# Patient Record
Sex: FEMALE | Race: BLACK OR AFRICAN AMERICAN | NOT HISPANIC OR LATINO | Employment: UNEMPLOYED | URBAN - METROPOLITAN AREA
[De-identification: names, ages, dates, MRNs, and addresses within clinical notes are randomized per-mention and may not be internally consistent; named-entity substitution may affect disease eponyms.]

---

## 2017-02-08 RX ORDER — QUETIAPINE FUMARATE 100 MG/1
100 TABLET, FILM COATED ORAL NIGHTLY
COMMUNITY
Start: 2017-02-04

## 2017-02-08 RX ORDER — PREDNISOLONE ACETATE 10 MG/ML
SUSPENSION/ DROPS OPHTHALMIC
COMMUNITY
Start: 2017-01-26 | End: 2018-08-09

## 2017-02-08 RX ORDER — IBUPROFEN 800 MG/1
TABLET ORAL
Refills: 0 | COMMUNITY
Start: 2017-01-07 | End: 2017-03-04

## 2017-02-08 RX ORDER — GLYBURIDE 2.5 MG/1
TABLET ORAL
COMMUNITY
Start: 2017-01-16 | End: 2022-09-07

## 2017-02-08 RX ORDER — FUROSEMIDE 40 MG/1
40 TABLET ORAL DAILY
COMMUNITY
Start: 2017-01-16

## 2017-02-08 RX ORDER — CALCIUM CITRATE/VITAMIN D3 200MG-6.25
TABLET ORAL
COMMUNITY
Start: 2017-01-30

## 2017-02-08 RX ORDER — HYDROCODONE BITARTRATE AND ACETAMINOPHEN 5; 325 MG/1; MG/1
TABLET ORAL
COMMUNITY
Start: 2017-02-06 | End: 2017-03-06

## 2017-02-08 RX ORDER — LOSARTAN POTASSIUM 50 MG/1
TABLET ORAL
Refills: 0 | COMMUNITY
Start: 2016-11-01 | End: 2017-03-06

## 2017-03-04 ENCOUNTER — HOSPITAL ENCOUNTER (EMERGENCY)
Facility: HOSPITAL | Age: 57
Discharge: HOME | End: 2017-03-04
Attending: EMERGENCY MEDICINE
Payer: MEDICAID

## 2017-03-04 VITALS
RESPIRATION RATE: 18 BRPM | SYSTOLIC BLOOD PRESSURE: 141 MMHG | TEMPERATURE: 98 F | DIASTOLIC BLOOD PRESSURE: 86 MMHG | HEART RATE: 94 BPM | HEIGHT: 67 IN | WEIGHT: 284 LBS | BODY MASS INDEX: 44.57 KG/M2 | OXYGEN SATURATION: 96 %

## 2017-03-04 DIAGNOSIS — R10.12 LEFT UPPER QUADRANT PAIN: ICD-10-CM

## 2017-03-04 DIAGNOSIS — R52 PAIN: ICD-10-CM

## 2017-03-04 DIAGNOSIS — R07.81 RIB PAIN ON LEFT SIDE: Primary | ICD-10-CM

## 2017-03-04 LAB
ALBUMIN SERPL BCP-MCNC: 4 G/DL
ALP SERPL-CCNC: 124 U/L
ALT SERPL W/O P-5'-P-CCNC: 15 U/L
AMYLASE SERPL-CCNC: 35 U/L
ANION GAP SERPL CALC-SCNC: 14 MMOL/L
AST SERPL-CCNC: 16 U/L
B-OH-BUTYR BLD STRIP-SCNC: 0.7 MMOL/L
BACTERIA #/AREA URNS HPF: ABNORMAL /HPF
BASOPHILS # BLD AUTO: 0.02 K/UL
BASOPHILS NFR BLD: 0.3 %
BILIRUB SERPL-MCNC: 0.6 MG/DL
BILIRUB UR QL STRIP: NEGATIVE
BUN SERPL-MCNC: 10 MG/DL
CALCIUM SERPL-MCNC: 9.7 MG/DL
CHLORIDE SERPL-SCNC: 99 MMOL/L
CLARITY UR: ABNORMAL
CO2 SERPL-SCNC: 23 MMOL/L
COLOR UR: YELLOW
CREAT SERPL-MCNC: 0.8 MG/DL
DIFFERENTIAL METHOD: NORMAL
EOSINOPHIL # BLD AUTO: 0.1 K/UL
EOSINOPHIL NFR BLD: 1 %
ERYTHROCYTE [DISTWIDTH] IN BLOOD BY AUTOMATED COUNT: 12.5 %
EST. GFR  (AFRICAN AMERICAN): >60 ML/MIN/1.73 M^2
EST. GFR  (NON AFRICAN AMERICAN): >60 ML/MIN/1.73 M^2
GLUCOSE SERPL-MCNC: 312 MG/DL (ref 70–110)
GLUCOSE SERPL-MCNC: 400 MG/DL
GLUCOSE UR QL STRIP: ABNORMAL
HCT VFR BLD AUTO: 42.7 %
HGB BLD-MCNC: 13.8 G/DL
HGB UR QL STRIP: ABNORMAL
KETONES UR QL STRIP: NEGATIVE
LEUKOCYTE ESTERASE UR QL STRIP: NEGATIVE
LIPASE SERPL-CCNC: 30 U/L
LYMPHOCYTES # BLD AUTO: 2.7 K/UL
LYMPHOCYTES NFR BLD: 33.8 %
MCH RBC QN AUTO: 28.8 PG
MCHC RBC AUTO-ENTMCNC: 32.3 %
MCV RBC AUTO: 89 FL
MICROSCOPIC COMMENT: ABNORMAL
MONOCYTES # BLD AUTO: 0.4 K/UL
MONOCYTES NFR BLD: 4.7 %
NEUTROPHILS # BLD AUTO: 4.7 K/UL
NEUTROPHILS NFR BLD: 60.1 %
NITRITE UR QL STRIP: NEGATIVE
PH UR STRIP: 5 [PH] (ref 5–8)
PLATELET # BLD AUTO: 217 K/UL
PMV BLD AUTO: 11.6 FL
POTASSIUM SERPL-SCNC: 4 MMOL/L
PROT SERPL-MCNC: 7.9 G/DL
PROT UR QL STRIP: NEGATIVE
RBC # BLD AUTO: 4.8 M/UL
RBC #/AREA URNS HPF: 1 /HPF (ref 0–4)
SODIUM SERPL-SCNC: 136 MMOL/L
SP GR UR STRIP: <=1.005 (ref 1–1.03)
SQUAMOUS #/AREA URNS HPF: 6 /HPF
TROPONIN I SERPL DL<=0.01 NG/ML-MCNC: <0.006 NG/ML
URN SPEC COLLECT METH UR: ABNORMAL
UROBILINOGEN UR STRIP-ACNC: NEGATIVE EU/DL
WBC # BLD AUTO: 7.84 K/UL
WBC #/AREA URNS HPF: 6 /HPF (ref 0–5)
YEAST URNS QL MICRO: ABNORMAL

## 2017-03-04 PROCEDURE — 83690 ASSAY OF LIPASE: CPT

## 2017-03-04 PROCEDURE — 84484 ASSAY OF TROPONIN QUANT: CPT

## 2017-03-04 PROCEDURE — 81000 URINALYSIS NONAUTO W/SCOPE: CPT

## 2017-03-04 PROCEDURE — 96374 THER/PROPH/DIAG INJ IV PUSH: CPT

## 2017-03-04 PROCEDURE — 80053 COMPREHEN METABOLIC PANEL: CPT

## 2017-03-04 PROCEDURE — 85025 COMPLETE CBC W/AUTO DIFF WBC: CPT

## 2017-03-04 PROCEDURE — 63600175 PHARM REV CODE 636 W HCPCS: Performed by: PHYSICIAN ASSISTANT

## 2017-03-04 PROCEDURE — 99284 EMERGENCY DEPT VISIT MOD MDM: CPT | Mod: 25

## 2017-03-04 PROCEDURE — 82962 GLUCOSE BLOOD TEST: CPT

## 2017-03-04 PROCEDURE — 82150 ASSAY OF AMYLASE: CPT

## 2017-03-04 PROCEDURE — 82010 KETONE BODYS QUAN: CPT

## 2017-03-04 PROCEDURE — 25000003 PHARM REV CODE 250: Performed by: PHYSICIAN ASSISTANT

## 2017-03-04 RX ORDER — IBUPROFEN 600 MG/1
600 TABLET ORAL EVERY 6 HOURS PRN
Qty: 20 TABLET | Refills: 0 | Status: SHIPPED | OUTPATIENT
Start: 2017-03-04 | End: 2017-03-06

## 2017-03-04 RX ORDER — KETOROLAC TROMETHAMINE 30 MG/ML
15 INJECTION, SOLUTION INTRAMUSCULAR; INTRAVENOUS
Status: COMPLETED | OUTPATIENT
Start: 2017-03-04 | End: 2017-03-04

## 2017-03-04 RX ORDER — HYDROCODONE BITARTRATE AND ACETAMINOPHEN 5; 325 MG/1; MG/1
1 TABLET ORAL
Status: COMPLETED | OUTPATIENT
Start: 2017-03-04 | End: 2017-03-04

## 2017-03-04 RX ADMIN — HYDROCODONE BITARTRATE AND ACETAMINOPHEN 1 TABLET: 5; 325 TABLET ORAL at 09:03

## 2017-03-04 RX ADMIN — KETOROLAC TROMETHAMINE 15 MG: 30 INJECTION, SOLUTION INTRAMUSCULAR at 09:03

## 2017-03-04 NOTE — ED PROVIDER NOTES
Encounter Date: 3/4/2017       History     Chief Complaint   Patient presents with    Abdominal Pain     left upper quadrant pain     Review of patient's allergies indicates:   Allergen Reactions    Penicillins Rash     HPI Comments:  Mame Kumari 56 y.o. nontoxic/afebrile female with PMH of HTN, DM, arthritis, GERD, asthma, neuropathy and obesity presented to the ED with C/O left-sided pain for the past one day.  She states that the pain is a constant ache located to the left lower rib and LUQ of the abdomen. Pain is exacerbated by certain movements and nothing mitigates pain. She reports some decreased appetite due to the pain however did eat this morning and C/O increased thirst and urination that is not uncommon for her. She denies any fever, chills, cough, sternal chest pain, SOB, nausea, vomiting, diarrhea, constipation, flank pain, or dysuria. She did not attempt any medications for the symptoms and did not take her regular medications today.     The history is provided by the patient.     Past Medical History:   Diagnosis Date    Arthritis     Asthma     Diabetes mellitus     GERD (gastroesophageal reflux disease)     Hypertension     Neuropathy      Past Surgical History:   Procedure Laterality Date    TUBAL LIGATION       Family History   Problem Relation Age of Onset    Cancer Mother     Cancer Father      Social History   Substance Use Topics    Smoking status: Never Smoker    Smokeless tobacco: None      Comment: Stay smoke free.    Alcohol use No     Review of Systems   Constitutional: Positive for appetite change. Negative for activity change, chills and fever.   HENT: Negative for congestion and sore throat.    Respiratory: Negative for cough, chest tightness, shortness of breath and wheezing.    Cardiovascular: Negative for chest pain.        Left lower rib pain    Gastrointestinal: Positive for abdominal pain. Negative for constipation, diarrhea, nausea and vomiting.    Endocrine: Positive for polydipsia and polyuria.   Genitourinary: Negative for dysuria, flank pain and hematuria.   Musculoskeletal: Negative for back pain.   Skin: Negative for rash.   Neurological: Negative for dizziness, weakness, light-headedness, numbness and headaches.   Hematological: Does not bruise/bleed easily.       Physical Exam   Initial Vitals   BP Pulse Resp Temp SpO2   03/04/17 0744 03/04/17 0744 03/04/17 0744 03/04/17 0744 03/04/17 0744   117/66 93 18 97.9 °F (36.6 °C) 97 %     Physical Exam    Nursing note and vitals reviewed.  Constitutional: Vital signs are normal. She appears well-developed and well-nourished. She is cooperative.  Non-toxic appearance. She does not appear ill. No distress.   HENT:   Head: Normocephalic and atraumatic.   Eyes: Conjunctivae and lids are normal.   Neck: Neck supple. No rigidity.   Cardiovascular: Normal rate and regular rhythm.   Pulmonary/Chest: Breath sounds normal. No respiratory distress. She has no wheezes. She has no rhonchi. She exhibits tenderness. She exhibits no crepitus, no edema and no swelling.       Abdominal: Soft. Normal appearance and bowel sounds are normal. She exhibits no distension. There is tenderness in the left upper quadrant. There is no rigidity, no rebound, no guarding and no CVA tenderness.       Musculoskeletal: Normal range of motion.   Neurological: She is alert and oriented to person, place, and time. She has normal strength. GCS eye subscore is 4. GCS verbal subscore is 5. GCS motor subscore is 6.   Skin: Skin is warm, dry and intact. No rash noted.   Psychiatric: She has a normal mood and affect. Her speech is normal and behavior is normal. Thought content normal.         ED Course   Procedures  Labs Reviewed   CBC W/ AUTO DIFFERENTIAL   COMPREHENSIVE METABOLIC PANEL   LIPASE   URINALYSIS   AMYLASE   TROPONIN I   BETA - HYDROXYBUTYRATE, SERUM     Imaging Results         X-Ray Chest PA And Lateral (Final result) Result time:   03/04/17 09:33:13    Final result by Bk Lozada MD (03/04/17 09:33:13)    Impression:      1.  No acute chest disease.      Electronically signed by: BK LOZADA MD  Date:     03/04/17  Time:    09:33     Narrative:    Comparison: March 17, 2016    Technique: 2 views of the chest    Findings: The lateral view is suboptimal secondary to patient positioning.  The cardiomediastinal silhouette is midline and within normal limits.  The lungs are clear.  Visualized osseous structures and soft tissues demonstrate no acute abnormalities.                Mame Kumari 56 y.o. nontoxic/afebrile female with PMH of HTN, DM, arthritis, GERD, asthma, neuropathy and obesity presented to the ED with C/O left-sided pain for the past one day.  She states that the pain is a constant ache located to the left lower rib and LUQ of the abdomen. Pain is exacerbated by certain movements and nothing mitigates pain. She reports some decreased appetite due to the pain however did eat this morning and C/O increased thirst and urination that is not uncommon for her. She denies any fever, chills, cough, sternal chest pain, SOB, nausea, vomiting, diarrhea, constipation, flank pain, or dysuria. She did not attempt any medications for the symptoms and did not take her regular medications today. ROS positive for left rib and abdominal pain.  Physical exam reveals patient well appearing in no obvious distress; lifting child during the exam. Lungs clear, heart regular rate and rhythm with TTP of the left lower rib border with no deformity, edema, contusion or rash. Abdomen is soft with mild LUQ tenderness; no rebound, rigidity or CVA tenderness and normal bowel sounds. Fair ROM of neck and all extremities with strength 5/5.     DDX: chest wall strain, pancreatitis, DKA, UTI, GERD, abdominal muscle strain    ED management: Pain reduced with toradol and norco in the ED. labs and CXR showing no acute abnormality of chest, negative troponin and  elevated glucose. Low suspicion for acute cardiac etiology as reproducible pain. Patient reports that she is going directly home to take DM medication. She was counseled about BG control.    Impression/Plan:The primary encounter diagnosis was Rib pain on left side. Diagnoses of Pain and Left upper quadrant pain were also pertinent to this visit.  Patient will follow up with Primary.  Patient cautioned on when to return to ED.  Pt. Understands and agrees with current treatment plan                      Attending Attestation:     Physician Attestation Statement for NP/PA:   I have conducted a face to face encounter with this patient in addition to the NP/PA, due to Medical Complexity    Other NP/PA Attestation Additions:    History of Present Illness: agree   Physical Exam: agree   Medical Decision Making: agree                 ED Course     Clinical Impression:   The primary encounter diagnosis was Rib pain on left side. Diagnoses of Pain and Left upper quadrant pain were also pertinent to this visit.          SHIVA Jackson  03/06/17 1545

## 2017-03-04 NOTE — ED AVS SNAPSHOT
[facility]  [address]  [place] [address]               Mame Kumari   3/4/2017  7:49 AM   ED    Description:  Female : 1960   Department:  [facility]           Your Care was Coordinated By:     Provider Role From To    Miguelangel Valadez MD Attending Provider 17 [id number] --    SHIVA Jackson Physician Assistant 17 [id number] --      Reason for Visit     Abdominal Pain           Diagnoses this Visit        Comments    Rib pain on left side    -  Primary     Pain         Left upper quadrant pain           ED Disposition     None           To Do List           Follow-up Information     Follow up with Teddy Blake MD. Go in 1 week.    Specialty:  Family Medicine    Contact information:    [address]  [address]  [place] [address]  [phone]         These Medications        Disp Refills Start End    ibuprofen (ADVIL,MOTRIN) 600 MG tablet 20 tablet 0 3/4/2017     Take 1 tablet (600 mg total) by mouth every 6 (six) hours as needed for Pain. - Oral    Pharmacy: [address] Ph #: [phone]         [facility]     [facility] Care [facility] -  Assistance  Registered nurses in the [facility] provide clinical advisement, health education, appointment booking, and other advisory services.  Call for this free service at [phone].             Medications           Message regarding Medications     Verify the changes and/or additions to your medication regime listed below are the same as discussed with your clinician today.  If any of these changes or additions are incorrect, please notify your healthcare provider.        START taking these NEW medications        Refills    ibuprofen (ADVIL,MOTRIN) 600 MG tablet 0    Sig: Take 1 tablet (600 mg total) by mouth every 6 (six) hours as needed for Pain.    Class: Print    Route: Oral      These medications were  "administered today        Dose Freq    ketorolac injection 15 mg 15 mg ED 1 Time    Sig: Inject 15 mg into the vein ED 1 Time.    Class: Normal    Route: Intravenous    Cosign for Ordering: Required by Miguelangel Valadez MD           Verify that the below list of medications is an accurate representation of the medications you are currently taking.  If none reported, the list may be blank. If incorrect, please contact your healthcare provider. Carry this list with you in case of emergency.           Current Medications     albuterol (ACCUNEB) 0.63 mg/3 mL Nebu Take 3 mLs (0.63 mg total) by nebulization every 6 (six) hours as needed.    duloxetine (CYMBALTA) 30 MG capsule Take 1 capsule (30 mg total) by mouth once daily.    fluticasone (FLONASE) 50 mcg/actuation nasal spray 1 spray by Each Nare route 2 (two) times daily as needed for Rhinitis.    furosemide (LASIX) 40 MG tablet     glyBURIDE (DIABETA) 2.5 MG tablet     glyBURIDE (DIABETA) 5 MG tablet Take 1 tablet (5 mg total) by mouth 2 (two) times daily with meals.    hydrocodone-acetaminophen 5-325mg (NORCO) 5-325 mg per tablet     ibuprofen (ADVIL,MOTRIN) 600 MG tablet Take 1 tablet (600 mg total) by mouth every 6 (six) hours as needed for Pain.    LANTUS SOLOSTAR 100 unit/mL (3 mL) InPn pen     lisinopril (PRINIVIL,ZESTRIL) 5 MG tablet Take 1 tablet (5 mg total) by mouth once daily.    losartan (COZAAR) 50 MG tablet TK 1 T PO QD    NOVOFINE 32 32 x 1/4 " Ndle     prednisoLONE acetate (PRED FORTE) 1 % DrpS     quetiapine (SEROQUEL) 100 MG Tab     tiotropium (SPIRIVA) 18 mcg inhalation capsule Inhale 1 capsule (18 mcg total) into the lungs once daily.    tramadol 150 mg MP25 Take 150 mg by mouth once daily.    TRUE METRIX GLUCOSE TEST STRIP Strp            Clinical Reference Information           Your Vitals Were     BP Pulse Temp Resp Height Weight    141/86 94 97.9 °F (36.6 °C) 18 5' 7" (1.702 m) 128.8 kg (284 lb)    SpO2 BMI             96% 44.48 kg/m2       "   Allergies as of 3/4/2017        Reactions    Penicillins Rash      Immunizations Administered on Date of Encounter - 3/4/2017     None      ED Micro, Lab, POCT     Start Ordered       Status Ordering Provider    03/04/17 0834 03/04/17 0833  Beta - Hydroxybutyrate, Serum  Once      Final result     03/04/17 0830 03/04/17 0830  Urinalysis Microscopic  Once      Final result     03/04/17 0829 03/04/17 0830  CBC W/ AUTO DIFFERENTIAL  Once      Final result     03/04/17 0829 03/04/17 0830  Comp. Metabolic Panel  STAT      Final result     03/04/17 0829 03/04/17 0830  Lipase  STAT      Final result     03/04/17 0829 03/04/17 0830  Urinalysis - Clean Catch  STAT      Final result     03/04/17 0829 03/04/17 0830  Amylase  STAT      Final result     03/04/17 0829 03/04/17 0830  Troponin I  STAT      Final result     03/04/17 0000 03/04/17 0747  POCT glucose     Comments:  This order was created through External Result Entry    Completed       ED Imaging Orders     Start Ordered       Status Ordering Provider    03/04/17 0847 03/04/17 0846  X-Ray Chest PA And Lateral  1 time imaging      Final result       Discharge References/Attachments     CHEST WALL PAIN, COSTOCHONDRITIS (ENGLISH)    ABDOMINAL PAIN, ADULT (ENGLISH)      Your Scheduled Appointments     Mar 06, 2017  1:20 PM CST   New Patient with AARON Thrasher - Family Medicine ([facility])    [address], [facility] [id number] [phone]              [facility] Sign-Up     Activating your [facility] account is as easy as 1-2-3!     1) Visit [email or web address], select Sign Up Now, enter this activation code and your date of birth, then select Next.  Activation code not generated  Current Patient Portal Status: Account disabled      2) Create a username and password to use when you visit [facility] in the future and select a security question in case you lose your password and select Next.    3) Enter your e-mail address and click  Sign Up!    Additional Information  If you have questions, please e-mail [email or web address] or call [phone] to talk to our [facility] staff. Remember, [facility] is NOT to be used for urgent needs. For medical emergencies, dial 911.          [facility] complies with applicable Federal civil rights laws and does not discriminate on the basis of race, color, national origin, age, disability, or sex.        Language Assistance Services     ATTENTION: Language assistance services are available, free of charge. Please call [phone].      ATENCIÓN: Si habla español, tiene a bal disposición servicios gratuitos de asistencia lingüística. Llame al [phone].     CHÚ Ý: N?u b?n nói Ti?ng Vi?t, có các d?ch v? h? tr? ngôn ng? mi?n phí dành cho b?n. G?i s? [phone].

## 2017-03-04 NOTE — ED NOTES
Pt presents to ED with left upper quadrant abdominal pain since last night. Pt denies nausea, vomiting or diarrhea. Pt does not present in distress. Rates 10/10.

## 2017-03-05 LAB — POCT GLUCOSE: 312 MG/DL (ref 70–110)

## 2017-05-27 ENCOUNTER — HOSPITAL ENCOUNTER (EMERGENCY)
Facility: HOSPITAL | Age: 57
Discharge: HOME | End: 2017-05-27
Attending: EMERGENCY MEDICINE
Payer: MEDICAID

## 2017-05-27 VITALS
WEIGHT: 275 LBS | RESPIRATION RATE: 18 BRPM | HEART RATE: 91 BPM | OXYGEN SATURATION: 98 % | BODY MASS INDEX: 44.2 KG/M2 | TEMPERATURE: 97 F | HEIGHT: 66 IN | SYSTOLIC BLOOD PRESSURE: 125 MMHG | DIASTOLIC BLOOD PRESSURE: 71 MMHG

## 2017-05-27 DIAGNOSIS — E11.65 TYPE 2 DIABETES MELLITUS WITH HYPERGLYCEMIA, UNSPECIFIED LONG TERM INSULIN USE STATUS: ICD-10-CM

## 2017-05-27 DIAGNOSIS — E08.43 DIABETIC AUTONOMIC NEUROPATHY ASSOCIATED WITH DIABETES MELLITUS DUE TO UNDERLYING CONDITION: ICD-10-CM

## 2017-05-27 DIAGNOSIS — G62.9 NEUROPATHY: ICD-10-CM

## 2017-05-27 DIAGNOSIS — K21.9 GASTROESOPHAGEAL REFLUX DISEASE, ESOPHAGITIS PRESENCE NOT SPECIFIED: Primary | ICD-10-CM

## 2017-05-27 LAB — POCT GLUCOSE: 324 MG/DL (ref 70–110)

## 2017-05-27 PROCEDURE — 99283 EMERGENCY DEPT VISIT LOW MDM: CPT | Mod: 25

## 2017-05-27 PROCEDURE — 25000003 PHARM REV CODE 250: Performed by: NURSE PRACTITIONER

## 2017-05-27 PROCEDURE — 63600175 PHARM REV CODE 636 W HCPCS: Performed by: NURSE PRACTITIONER

## 2017-05-27 PROCEDURE — 96372 THER/PROPH/DIAG INJ SC/IM: CPT

## 2017-05-27 PROCEDURE — 82962 GLUCOSE BLOOD TEST: CPT

## 2017-05-27 RX ORDER — KETOROLAC TROMETHAMINE 30 MG/ML
30 INJECTION, SOLUTION INTRAMUSCULAR; INTRAVENOUS
Status: COMPLETED | OUTPATIENT
Start: 2017-05-27 | End: 2017-05-27

## 2017-05-27 RX ORDER — PANTOPRAZOLE SODIUM 20 MG/1
20 TABLET, DELAYED RELEASE ORAL DAILY
Qty: 30 TABLET | Refills: 0 | Status: ON HOLD | OUTPATIENT
Start: 2017-05-27 | End: 2018-08-13 | Stop reason: HOSPADM

## 2017-05-27 RX ADMIN — KETOROLAC TROMETHAMINE 30 MG: 30 INJECTION, SOLUTION INTRAMUSCULAR at 12:05

## 2017-05-27 RX ADMIN — LIDOCAINE HYDROCHLORIDE: 20 SOLUTION ORAL; TOPICAL at 12:05

## 2017-05-27 NOTE — ED PROVIDER NOTES
"Encounter Date: 5/27/2017       History     Chief Complaint   Patient presents with    Leg Pain     bilateral leg pain for one week and left side pain     Review of patient's allergies indicates:   Allergen Reactions    Lantus [insulin glargine] Swelling     Face swelling and itching.    Penicillins Rash     55 yo F reports epigastric pain and chronic leg tingling. Pt states both complaints have been going on for several months but she "doesnt like her primary care doctor so she comes to the Er". Pt denies any new symptoms or change in symptoms. Denies N/V. Reports history of GERD and Diabetic Neuropathy. Was prescribed a "stomach medicine" but takes pepto-bismol instead. Also prescribed gabapentin for leg numbness and foot pain but doesn't take prescribed medications. Denies loss of bowel or bladder control. Denies injury to back. Denies radiation of pain.          Past Medical History:   Diagnosis Date    Arthritis     Asthma     Diabetes mellitus     GERD (gastroesophageal reflux disease)     Hypertension     Neuropathy      Past Surgical History:   Procedure Laterality Date    TUBAL LIGATION       Family History   Problem Relation Age of Onset    Cancer Mother     Cancer Father      Social History   Substance Use Topics    Smoking status: Never Smoker    Smokeless tobacco: Not on file      Comment: Stay smoke free.    Alcohol use No     Review of Systems   Constitutional: Negative for activity change, appetite change, chills and fever.   HENT: Negative for sore throat.    Respiratory: Negative for shortness of breath.    Cardiovascular: Negative for chest pain.   Gastrointestinal: Positive for abdominal pain. Negative for nausea.   Genitourinary: Negative for dysuria.   Musculoskeletal: Negative for back pain.   Skin: Negative for rash.   Neurological: Negative for dizziness, speech difficulty, weakness, light-headedness and numbness.        Leg tingling   Hematological: Does not bruise/bleed " "easily.   All other systems reviewed and are negative.      Physical Exam     Initial Vitals [05/27/17 1050]   BP Pulse Resp Temp SpO2   (!) 145/69 92 18 97.8 °F (36.6 °C) 97 %     Physical Exam    Nursing note and vitals reviewed.  Constitutional: She appears well-developed and well-nourished. No distress.   HENT:   Head: Normocephalic.   Eyes: Conjunctivae are normal.   Neck: Normal range of motion. Neck supple.   Cardiovascular: Normal rate.   Pulmonary/Chest: Breath sounds normal. No respiratory distress.   Abdominal: Soft. Bowel sounds are normal. She exhibits no distension and no abdominal bruit. There is generalized tenderness. There is no rebound and no guarding. No hernia.   Musculoskeletal:        Right foot: There is normal range of motion, no tenderness, no bony tenderness and no swelling.        Left foot: There is no tenderness and no swelling.   Neurological: She is alert and oriented to person, place, and time.   Skin: Skin is warm and dry. Capillary refill takes less than 2 seconds.   Psychiatric: She has a normal mood and affect. Her behavior is normal. Judgment and thought content normal.         ED Course   Procedures  Labs Reviewed - No data to display          Medical Decision Making:   Initial Assessment:   57 yo F reports epigastric pain and chronic leg tingling. Pt states both complaints have been going on for several months but she "doesnt like her primary care doctor so she comes to the Er". Pt denies any new symptoms or change in symptoms. Denies N/V. Reports history of GERD and Diabetic Neuropathy. Was prescribed a "stomach medicine" but takes pepto-bismol instead. Also prescribed gabapentin for leg numbness and foot pain but doesn't take prescribed medications. Denies loss of bowel or bladder control. Denies injury to back. Denies radiation of pain.  Differential Diagnosis:   Chronic GERD, Poorly controlled Dm, Chronic pain  ED Management:  Pt has not taken any home medications. " "Instructed to take home medications as prescribed for management of symptoms. CBG elevated pt states "I dont need medications for that, it does that when I eat". Educated pt on importance of taking home medications for glucose control and symptom management. Pt verbalized understanding will take medications at home upon discharge.                   ED Course     Clinical Impression:   The primary encounter diagnosis was Gastroesophageal reflux disease, esophagitis presence not specified. Diagnoses of Neuropathy, Diabetic autonomic neuropathy associated with diabetes mellitus due to underlying condition, and Type 2 diabetes mellitus with hyperglycemia, unspecified long term insulin use status were also pertinent to this visit.          Benita Olmstead NP  06/02/17 1201    "

## 2017-05-27 NOTE — ED NOTES
States she is feeling much better, denies pain at this time. Skin warm and dry. Resp. Even and unlabored. Pt instructed to take her home medication as directed when she gets home.

## 2017-11-04 ENCOUNTER — HOSPITAL ENCOUNTER (EMERGENCY)
Facility: HOSPITAL | Age: 57
Discharge: HOME | End: 2017-11-04
Attending: EMERGENCY MEDICINE
Payer: MEDICAID

## 2017-11-04 VITALS
DIASTOLIC BLOOD PRESSURE: 65 MMHG | RESPIRATION RATE: 18 BRPM | WEIGHT: 243 LBS | BODY MASS INDEX: 39.05 KG/M2 | OXYGEN SATURATION: 95 % | TEMPERATURE: 98 F | HEART RATE: 110 BPM | SYSTOLIC BLOOD PRESSURE: 143 MMHG | HEIGHT: 66 IN

## 2017-11-04 DIAGNOSIS — R10.9 ABDOMINAL PAIN: ICD-10-CM

## 2017-11-04 DIAGNOSIS — K52.9 GASTROENTERITIS: Primary | ICD-10-CM

## 2017-11-04 DIAGNOSIS — E11.65 POORLY CONTROLLED DIABETES MELLITUS: ICD-10-CM

## 2017-11-04 LAB
ANION GAP SERPL CALC-SCNC: 15 MMOL/L
BACTERIA #/AREA URNS AUTO: ABNORMAL /HPF
BILIRUB UR QL STRIP: NEGATIVE
BUN SERPL-MCNC: 9 MG/DL
CALCIUM SERPL-MCNC: 9.6 MG/DL
CHLORIDE SERPL-SCNC: 99 MMOL/L
CLARITY UR REFRACT.AUTO: ABNORMAL
CO2 SERPL-SCNC: 24 MMOL/L
COLOR UR AUTO: YELLOW
CREAT SERPL-MCNC: 0.59 MG/DL
EST. GFR  (AFRICAN AMERICAN): >60 ML/MIN/1.73 M^2
EST. GFR  (NON AFRICAN AMERICAN): >60 ML/MIN/1.73 M^2
GLUCOSE SERPL-MCNC: 491 MG/DL
GLUCOSE UR QL STRIP: ABNORMAL
HGB UR QL STRIP: ABNORMAL
HYALINE CASTS UR QL AUTO: 0 /LPF
KETONES UR QL STRIP: NEGATIVE
LEUKOCYTE ESTERASE UR QL STRIP: ABNORMAL
MICROSCOPIC COMMENT: ABNORMAL
NITRITE UR QL STRIP: NEGATIVE
PH UR STRIP: 5 [PH] (ref 5–8)
POCT GLUCOSE: 340 MG/DL (ref 70–110)
POCT GLUCOSE: 343 MG/DL (ref 70–110)
POCT GLUCOSE: 347 MG/DL (ref 70–110)
POCT GLUCOSE: 401 MG/DL (ref 70–110)
POTASSIUM SERPL-SCNC: 3.8 MMOL/L
PROT UR QL STRIP: ABNORMAL
RBC #/AREA URNS AUTO: 5 /HPF (ref 0–4)
SODIUM SERPL-SCNC: 138 MMOL/L
SP GR UR STRIP: 1.01 (ref 1–1.03)
SQUAMOUS #/AREA URNS AUTO: 10 /HPF
URN SPEC COLLECT METH UR: ABNORMAL
UROBILINOGEN UR STRIP-ACNC: NEGATIVE EU/DL
WBC #/AREA URNS AUTO: 20 /HPF (ref 0–5)
YEAST UR QL AUTO: ABNORMAL

## 2017-11-04 PROCEDURE — 96376 TX/PRO/DX INJ SAME DRUG ADON: CPT

## 2017-11-04 PROCEDURE — 81000 URINALYSIS NONAUTO W/SCOPE: CPT

## 2017-11-04 PROCEDURE — 96374 THER/PROPH/DIAG INJ IV PUSH: CPT

## 2017-11-04 PROCEDURE — 80048 BASIC METABOLIC PNL TOTAL CA: CPT

## 2017-11-04 PROCEDURE — 63600175 PHARM REV CODE 636 W HCPCS: Performed by: EMERGENCY MEDICINE

## 2017-11-04 PROCEDURE — 82962 GLUCOSE BLOOD TEST: CPT

## 2017-11-04 PROCEDURE — 93005 ELECTROCARDIOGRAM TRACING: CPT

## 2017-11-04 PROCEDURE — 99284 EMERGENCY DEPT VISIT MOD MDM: CPT | Mod: 25

## 2017-11-04 PROCEDURE — 87086 URINE CULTURE/COLONY COUNT: CPT

## 2017-11-04 PROCEDURE — 25000003 PHARM REV CODE 250: Performed by: EMERGENCY MEDICINE

## 2017-11-04 PROCEDURE — 93010 ELECTROCARDIOGRAM REPORT: CPT | Mod: ,,, | Performed by: INTERNAL MEDICINE

## 2017-11-04 RX ORDER — ONDANSETRON 4 MG/1
4 TABLET, ORALLY DISINTEGRATING ORAL EVERY 8 HOURS PRN
Qty: 10 TABLET | Refills: 0 | Status: SHIPPED | OUTPATIENT
Start: 2017-11-04 | End: 2021-05-03

## 2017-11-04 RX ORDER — ONDANSETRON 4 MG/1
4 TABLET, ORALLY DISINTEGRATING ORAL
Status: COMPLETED | OUTPATIENT
Start: 2017-11-04 | End: 2017-11-04

## 2017-11-04 RX ORDER — NITROFURANTOIN 25; 75 MG/1; MG/1
100 CAPSULE ORAL 2 TIMES DAILY
Qty: 14 CAPSULE | Refills: 0 | Status: SHIPPED | OUTPATIENT
Start: 2017-11-04 | End: 2017-11-11

## 2017-11-04 RX ADMIN — ONDANSETRON 4 MG: 4 TABLET, ORALLY DISINTEGRATING ORAL at 04:11

## 2017-11-04 RX ADMIN — INSULIN HUMAN 10 UNITS: 100 INJECTION, SOLUTION PARENTERAL at 05:11

## 2017-11-04 RX ADMIN — LIDOCAINE HYDROCHLORIDE: 20 SOLUTION ORAL; TOPICAL at 06:11

## 2017-11-04 NOTE — ED NOTES
Pt unable to provide stool sample at this time. Pt asking for water. Educated pt about NPO status. Explained that she would be allowed to have some ice chips for a PO challenge in a little while.

## 2017-11-04 NOTE — ED PROVIDER NOTES
"Encounter Date: 11/4/2017       History     Chief Complaint   Patient presents with    Fatigue     Pt reprots generalized weakness and "acid in my stomach". Pt denies N/V/D. PT denies SOb or CP.    Abdominal Pain     This patient is a 56-year-old female.  Presents to the emergency department complaining of nausea vomiting and diarrhea.  She came to the emergency department because she was having some abdominal cramps, and feeling generally weak.  After she arrived here, the vomiting and diarrhea started, she has had 3 episodes of each, watery diarrhea, nonbloody.    He is diabetic, said that she has not checked her blood sugar .  No sick contacts, recent travel, or consumption of raw or suspicious food.  No history of liver or renal disease.          Review of patient's allergies indicates:   Allergen Reactions    Lantus [insulin glargine] Swelling     Face swelling and itching.    Penicillins Rash     Past Medical History:   Diagnosis Date    Arthritis     Asthma     Diabetes mellitus     GERD (gastroesophageal reflux disease)     Hypertension     Neuropathy      Past Surgical History:   Procedure Laterality Date    TUBAL LIGATION       Family History   Problem Relation Age of Onset    Cancer Mother     Cancer Father      Social History   Substance Use Topics    Smoking status: Never Smoker    Smokeless tobacco: Never Used      Comment: Stay smoke free.    Alcohol use No     Review of Systems   Constitutional: Positive for chills and fatigue.   Gastrointestinal: Positive for abdominal pain, diarrhea, nausea and vomiting.   Endocrine:        Diabetic   All other systems reviewed and are negative.      Physical Exam     Initial Vitals [11/04/17 1500]   BP Pulse Resp Temp SpO2   (!) 181/105 108 20 97.9 °F (36.6 °C) 96 %      MAP       130.33         Physical Exam    Nursing note and vitals reviewed.  Constitutional: She appears well-developed. She is not diaphoretic. No distress.   Obese female in no " apparent distress   HENT:   Head: Normocephalic.   Right Ear: External ear normal.   Left Ear: External ear normal.   Nose: Nose normal.   Mouth/Throat: Oropharynx is clear and moist.   Eyes: Conjunctivae are normal. Pupils are equal, round, and reactive to light. No scleral icterus.   Neck: No JVD present.   Cardiovascular: Normal rate, regular rhythm, normal heart sounds and intact distal pulses.   No murmur heard.  Pulmonary/Chest: Breath sounds normal. No stridor. No respiratory distress. She has no wheezes.   Abdominal: Soft. She exhibits no distension. There is no tenderness.   Musculoskeletal: She exhibits no edema or tenderness.   Neurological: She is alert. She has normal strength. No sensory deficit.   Skin: Skin is warm and dry. No rash noted.   Psychiatric: She has a normal mood and affect.         ED Course   Procedures  Labs Reviewed   URINALYSIS - Abnormal; Notable for the following:        Result Value    Appearance, UA Cloudy (*)     Protein, UA 1+ (*)     Glucose, UA 4+ (*)     Occult Blood UA 3+ (*)     Leukocytes, UA 3+ (*)     All other components within normal limits   URINALYSIS MICROSCOPIC - Abnormal; Notable for the following:     RBC, UA 5 (*)     WBC, UA 20 (*)     Bacteria, UA Moderate (*)     All other components within normal limits   BASIC METABOLIC PANEL - Abnormal; Notable for the following:     Glucose 491 (*)     All other components within normal limits    Narrative:       GLU critical result(s) called and verbal readback obtained from   , 11/04/2017 18:24   POCT GLUCOSE - Abnormal; Notable for the following:     POCT Glucose 401 (*)     All other components within normal limits   POCT GLUCOSE - Abnormal; Notable for the following:     POCT Glucose 347 (*)     All other components within normal limits   CULTURE, URINE   CULTURE, STOOL   CULTURE, URINE   WBC, STOOL   POCT GLUCOSE MONITORING CONTINUOUS   POCT GLUCOSE MONITORING CONTINUOUS        Results for orders placed  or performed during the hospital encounter of 11/04/17   Urinalysis   Result Value Ref Range    Specimen UA Urine, Clean Catch     Color, UA Yellow Yellow, Straw, Tesha    Appearance, UA Cloudy (A) Clear    pH, UA 5.0 5.0 - 8.0    Specific Gravity, UA 1.010 1.005 - 1.030    Protein, UA 1+ (A) Negative    Glucose, UA 4+ (A) Negative    Ketones, UA Negative Negative    Bilirubin (UA) Negative Negative    Occult Blood UA 3+ (A) Negative    Nitrite, UA Negative Negative    Urobilinogen, UA Negative <2.0 EU/dL    Leukocytes, UA 3+ (A) Negative   Urinalysis Microscopic   Result Value Ref Range    RBC, UA 5 (H) 0 - 4 /hpf    WBC, UA 20 (H) 0 - 5 /hpf    Bacteria, UA Moderate (A) None-Occ /hpf    Yeast, UA None None    Squam Epithel, UA 10 /hpf    Hyaline Casts, UA 0 0-1/lpf /lpf    Microscopic Comment SEE COMMENT    Basic metabolic panel   Result Value Ref Range    Sodium 138 136 - 145 mmol/L    Potassium 3.8 3.5 - 5.1 mmol/L    Chloride 99 95 - 110 mmol/L    CO2 24 23 - 29 mmol/L    Glucose 491 (HH) 70 - 110 mg/dL    BUN, Bld 9 7 - 17 mg/dL    Creatinine 0.59 0.50 - 1.40 mg/dL    Calcium 9.6 8.7 - 10.5 mg/dL    Anion Gap 15 8 - 16 mmol/L    eGFR if African American >60.0 >60 mL/min/1.73 m^2    eGFR if non African American >60.0 >60 mL/min/1.73 m^2   POCT glucose   Result Value Ref Range    POCT Glucose 401 (H) 70 - 110 mg/dL   POCT glucose   Result Value Ref Range    POCT Glucose 347 (H) 70 - 110 mg/dL        Medical Decision Making:   Initial Assessment:   This patient presents with vomiting and diarrhea, sounds like typical viral gastroenteritis.  She has not been able to produce a stool specimen in the emergency department to sent to lab for testing.  She is also diabetic, and her blood sugar is poorly controlled, over 400 initially.  She has been given 2 doses of IV insulin, and her blood sugar is now under 300.  Advised her to check her blood sugar at least twice a day, I will place her on Zofran to use as needed for  nausea and vomiting.  She also has evidence of urinary tract infection, so I will place her on Macrobid, urine culture sent, does not appear to have pyelonephritis and she does not have a fever or flank tenderness.  She should follow-up with her primary care doctor in 2 days to have her blood sugar rechecked and for the results of the urine culture.                   ED Course      Clinical Impression:   The primary encounter diagnosis was Gastroenteritis. Diagnoses of Abdominal pain and Poorly controlled diabetes mellitus were also pertinent to this visit.    Disposition:   Disposition: Discharged  Condition: Stable                        Rui Esteban MD  11/04/17 3125

## 2017-11-04 NOTE — DISCHARGE INSTRUCTIONS
Check your blood sugar at least twice a day.  Follow-up with her primary care physician in 2-3 days to check your urine culture results, and to recheck your blood sugar

## 2017-11-05 NOTE — ED NOTES
"Pt and spouse upset that pt has not been provided with food even though she has been receiving insulin. Explained that pt's CBG has not decreased enough and that providing food would continue to keep the CBG elevated. Family reports that the sugar remains high because she HAS NOT eaten. Explained to pt that insulin brings the sugar down and eating will make it rise again. Pt states he "must have been told wrong then". Pt refusing insulin stating that she will take her home insulin when she gets home. Pt leaves with discharge papers in hand with prescriptions. Explained the importance of monitoring CBG and taking her meds regularly. Pt advised that she needs to follow up with her PCP. Pt verbalizes understanding.   "

## 2017-11-06 LAB
BACTERIA UR CULT: NORMAL
BACTERIA UR CULT: NORMAL

## 2018-05-21 ENCOUNTER — HOSPITAL ENCOUNTER (EMERGENCY)
Facility: HOSPITAL | Age: 58
Discharge: HOME | End: 2018-05-21
Attending: EMERGENCY MEDICINE
Payer: MEDICAID

## 2018-05-21 VITALS
TEMPERATURE: 98 F | RESPIRATION RATE: 20 BRPM | OXYGEN SATURATION: 99 % | DIASTOLIC BLOOD PRESSURE: 82 MMHG | SYSTOLIC BLOOD PRESSURE: 190 MMHG | WEIGHT: 250 LBS | HEART RATE: 95 BPM | HEIGHT: 66 IN | BODY MASS INDEX: 40.18 KG/M2

## 2018-05-21 DIAGNOSIS — M19.90 ARTHRITIS: Primary | ICD-10-CM

## 2018-05-21 PROCEDURE — 99283 EMERGENCY DEPT VISIT LOW MDM: CPT | Mod: 25

## 2018-05-21 PROCEDURE — 63600175 PHARM REV CODE 636 W HCPCS: Performed by: EMERGENCY MEDICINE

## 2018-05-21 PROCEDURE — 96372 THER/PROPH/DIAG INJ SC/IM: CPT

## 2018-05-21 RX ORDER — DEXAMETHASONE SODIUM PHOSPHATE 4 MG/ML
12 INJECTION, SOLUTION INTRA-ARTICULAR; INTRALESIONAL; INTRAMUSCULAR; INTRAVENOUS; SOFT TISSUE
Status: COMPLETED | OUTPATIENT
Start: 2018-05-21 | End: 2018-05-21

## 2018-05-21 RX ORDER — QUETIAPINE FUMARATE 50 MG/1
50 TABLET, FILM COATED ORAL NIGHTLY
COMMUNITY
End: 2019-09-18

## 2018-05-21 RX ORDER — KETOROLAC TROMETHAMINE 10 MG/1
10 TABLET, FILM COATED ORAL EVERY 6 HOURS
Qty: 15 TABLET | Refills: 0 | Status: SHIPPED | OUTPATIENT
Start: 2018-05-21 | End: 2018-11-17

## 2018-05-21 RX ADMIN — DEXAMETHASONE SODIUM PHOSPHATE 12 MG: 4 INJECTION, SOLUTION INTRAMUSCULAR; INTRAVENOUS at 10:05

## 2018-05-22 NOTE — ED PROVIDER NOTES
Encounter Date: 5/21/2018       History     Chief Complaint   Patient presents with    Knee Pain     R knee pain x 2 months, worsening today; denies injury; states was seen and tx for same on 5/19/18 at [facility] and hx of arthritis, reports no improvement     The history is provided by the patient.   Leg Pain    The incident occurred at home. There was no injury mechanism. The incident occurred several weeks ago. The pain is present in the right knee. The quality of the pain is described as throbbing. The pain is at a severity of 6/10. The pain has been constant since onset. Pertinent negatives include no numbness, no inability to bear weight, no loss of motion, no muscle weakness, no loss of sensation and no tingling. She reports no foreign bodies present. The symptoms are aggravated by bearing weight and palpation. She has tried nothing for the symptoms.     Review of patient's allergies indicates:   Allergen Reactions    Lantus [insulin glargine] Swelling     Face swelling and itching.    Penicillins Rash     Past Medical History:   Diagnosis Date    Arthritis     Asthma     Diabetes mellitus     GERD (gastroesophageal reflux disease)     Hypertension     Neuropathy      Past Surgical History:   Procedure Laterality Date    TUBAL LIGATION       Family History   Problem Relation Age of Onset    Cancer Mother     Cancer Father      Social History   Substance Use Topics    Smoking status: Never Smoker    Smokeless tobacco: Never Used      Comment: Stay smoke free.    Alcohol use No     Review of Systems   Musculoskeletal: Positive for arthralgias.   Neurological: Negative for tingling and numbness.   All other systems reviewed and are negative.      Physical Exam     Initial Vitals [05/21/18 2144]   BP Pulse Resp Temp SpO2   (!) 190/82 95 20 98.4 °F (36.9 °C) 99 %      MAP       118         Physical Exam    Nursing note and vitals reviewed.  Constitutional: She appears well-developed and  well-nourished.   HENT:   Head: Normocephalic and atraumatic.   Eyes: Conjunctivae and EOM are normal.   Neck: Normal range of motion. Neck supple.   Cardiovascular: Normal rate, regular rhythm, normal heart sounds and intact distal pulses.   Pulmonary/Chest: Breath sounds normal.   Abdominal: Soft.   Musculoskeletal:        Right knee: She exhibits decreased range of motion and swelling. She exhibits no effusion, no ecchymosis, no deformity, no laceration, no erythema, normal alignment and normal patellar mobility. No tenderness found.   Neurological: She is alert and oriented to person, place, and time.   Skin: Skin is warm and dry. Capillary refill takes less than 2 seconds.   Psychiatric: She has a normal mood and affect. Her behavior is normal. Judgment and thought content normal.         ED Course   Procedures  Labs Reviewed - No data to display                               Clinical Impression:   The encounter diagnosis was Arthritis.    Disposition:   Disposition: Discharged  Condition: Stable                        Mary Alfaro MD  05/21/18 1467

## 2018-05-29 ENCOUNTER — OFFICE VISIT (OUTPATIENT)
Dept: ORTHOPEDICS | Facility: CLINIC | Age: 58
End: 2018-05-29
Payer: MEDICAID

## 2018-05-29 VITALS — WEIGHT: 250 LBS | BODY MASS INDEX: 40.18 KG/M2 | HEIGHT: 66 IN

## 2018-05-29 DIAGNOSIS — M17.11 PRIMARY OSTEOARTHRITIS OF RIGHT KNEE: Primary | ICD-10-CM

## 2018-05-29 PROCEDURE — 99999 PR PBB SHADOW E&M-EST. PATIENT-LVL II: CPT | Mod: PBBFAC,,, | Performed by: ORTHOPAEDIC SURGERY

## 2018-05-29 PROCEDURE — 99212 OFFICE O/P EST SF 10 MIN: CPT | Mod: PBBFAC,PN | Performed by: ORTHOPAEDIC SURGERY

## 2018-05-29 PROCEDURE — 99202 OFFICE O/P NEW SF 15 MIN: CPT | Mod: S$PBB,,, | Performed by: ORTHOPAEDIC SURGERY

## 2018-05-29 NOTE — LETTER
May 29, 2018      ADAMA Epstein MD  [address]  [address]  [facility] [address]           [facility]  [address]  [facility] [address]  Phone: [phone]  Fax: [phone]          Patient: Mame Kumari   MR Number: [id number]   YOB: 1960   Date of Visit: 5/29/2018       Dear Dr. ADAMA Epstein:    Thank you for referring Mame Kumari to me for evaluation. Attached you will find relevant portions of my assessment and plan of care.    If you have questions, please do not hesitate to call me. I look forward to following Mame Kumari along with you.    Sincerely,    Jairo Cruz MD    Enclosure  CC:  No Recipients    If you would like to receive this communication electronically, please contact [email or web address] or [phone] to request more information on [email or web address] Link access.    For providers and/or their staff who would like to refer a patient to [facility], please contact us through our one-stop-shop provider referral line, [facility], at [phone].    If you feel you have received this communication in error or would no longer like to receive these types of communications, please e-mail [email or web address]

## 2018-05-29 NOTE — PROGRESS NOTES
Subjective:      Patient ID: Mame Kumari is a 57 y.o. female.    Chief Complaint: Pain of the Right Knee    HPI     They have experienced problems with their right knee over the past 3 months. The patient denies relevant history of injury/aggravation. Pain is located medially Associated symptoms include swelling and giving way.  Symptoms are aggravated by walking. They have been treated with over the counter analgesics, steroid injection(s) and hyaluronidase injection(s).   Symptoms have recently stayed the same. Ambulation reportedly has been impaired. Self care ADLs are painful.     Review of Systems   Constitution: Negative for fever and weight loss.   HENT: Negative for congestion.    Eyes: Negative for visual disturbance.   Cardiovascular: Negative for chest pain.   Respiratory: Negative for shortness of breath.    Hematologic/Lymphatic: Negative for bleeding problem. Does not bruise/bleed easily.   Skin: Negative for poor wound healing.   Musculoskeletal: Positive for joint pain and joint swelling.   Gastrointestinal: Negative for abdominal pain.   Genitourinary: Negative for dysuria.   Neurological: Negative for seizures.   Psychiatric/Behavioral: Negative for altered mental status.   Allergic/Immunologic: Negative for persistent infections.         Objective:            Ortho/SPM Exam    Right Knee    There were no vitals filed for this visit.    The patient is not in acute distress.   Body habitus is obese.   The patient walks with a limp.  Resisted SLR negative.   The skin over the knee is intact.  Knee effusion trace  Tendernes is located presentmedial  Range of motion- Flexion 140 deg, Extension 0 deg,   Ligament exam:   MCL trace   Lachman intact              Post sag intact    LCL intact  Patellar apprehension negative.  Popliteal cyst negative  Patellar crepitation absent.  Flexion/pinch negative.  Pulses DP present, PT present.  Motor normal 5/5 strength in all tested muscle groups.   Sensory  normal.          There were no vitals filed for this visit.    I reviewed the relevant radiographic images for the patient's condition:  The left knee has mild medial compartment narrowing        Assessment:       No diagnosis found.     .  The acute symptoms are likely due to bone marrow edema  Plan:       There are no diagnoses linked to this encounter.    I explained my diagnostic impression and the reasoning behind it in detail, using layman's terms.  Models and/or pictures were used to help in the explanation.    Analgesics and oral medication should be at the discretion of primary physician    Brace applied and usage explained    Weight loss recommended

## 2018-05-31 ENCOUNTER — TELEPHONE (OUTPATIENT)
Dept: ORTHOPEDICS | Facility: CLINIC | Age: 58
End: 2018-05-31

## 2018-05-31 NOTE — TELEPHONE ENCOUNTER
Left message for patient to return call to office.    ----- Message from Dawna Boucher sent at 5/31/2018  8:28 AM CDT -----  Contact: Self. [phone]  Patient would like to speak with you about having severe knee pain since the brace was given to her. Please advise

## 2018-06-01 ENCOUNTER — HOSPITAL ENCOUNTER (EMERGENCY)
Facility: HOSPITAL | Age: 58
Discharge: HOME | End: 2018-06-01
Attending: EMERGENCY MEDICINE
Payer: MEDICAID

## 2018-06-01 VITALS
OXYGEN SATURATION: 100 % | TEMPERATURE: 98 F | SYSTOLIC BLOOD PRESSURE: 136 MMHG | WEIGHT: 272 LBS | HEIGHT: 66 IN | RESPIRATION RATE: 20 BRPM | BODY MASS INDEX: 43.71 KG/M2 | HEART RATE: 99 BPM | DIASTOLIC BLOOD PRESSURE: 73 MMHG

## 2018-06-01 DIAGNOSIS — M23.91 INTERNAL DERANGEMENT OF MULTIPLE SITES OF RIGHT KNEE: Primary | ICD-10-CM

## 2018-06-01 PROCEDURE — 99283 EMERGENCY DEPT VISIT LOW MDM: CPT

## 2018-06-01 PROCEDURE — 25000003 PHARM REV CODE 250: Performed by: EMERGENCY MEDICINE

## 2018-06-01 RX ORDER — NAPROXEN 500 MG/1
500 TABLET ORAL 2 TIMES DAILY WITH MEALS
Qty: 30 TABLET | Refills: 0 | Status: ON HOLD | OUTPATIENT
Start: 2018-06-01 | End: 2018-08-13

## 2018-06-01 RX ORDER — HYDROCODONE BITARTRATE AND ACETAMINOPHEN 5; 325 MG/1; MG/1
1 TABLET ORAL
Status: COMPLETED | OUTPATIENT
Start: 2018-06-01 | End: 2018-06-01

## 2018-06-01 RX ORDER — METHOCARBAMOL 500 MG/1
500 TABLET, FILM COATED ORAL
Status: COMPLETED | OUTPATIENT
Start: 2018-06-01 | End: 2018-06-01

## 2018-06-01 RX ORDER — HYDROCODONE BITARTRATE AND ACETAMINOPHEN 5; 325 MG/1; MG/1
1 TABLET ORAL EVERY 6 HOURS PRN
Qty: 4 TABLET | Refills: 0 | Status: SHIPPED | OUTPATIENT
Start: 2018-06-01 | End: 2018-08-09

## 2018-06-01 RX ORDER — METHOCARBAMOL 500 MG/1
500 TABLET, FILM COATED ORAL 3 TIMES DAILY
Qty: 30 TABLET | Refills: 0 | Status: SHIPPED | OUTPATIENT
Start: 2018-06-01 | End: 2018-06-06

## 2018-06-01 RX ADMIN — METHOCARBAMOL 500 MG: 500 TABLET ORAL at 09:06

## 2018-06-01 RX ADMIN — HYDROCODONE BITARTRATE AND ACETAMINOPHEN 1 TABLET: 5; 325 TABLET ORAL at 09:06

## 2018-06-01 NOTE — DISCHARGE INSTRUCTIONS
Using Ace wrap and walking around.  Apply ice to the 2-3 times daily.  Follow-up with her orthopedic appointment this coming week.

## 2018-06-01 NOTE — ED PROVIDER NOTES
"Encounter Date: 6/1/2018       History     Chief Complaint   Patient presents with    Knee Pain     Pt states has had pain to right knee x 1 month ago, states "I went to the foot doctor and he gave me meloxicam and it triggered my knee pain."  Pt denies going to PCP for follow up.      Well-developed 57-year-old female comes emergent complaints of right knee pain.  Patient states that she took the meloxicam prescribed to her by her primary care physician.  Shortly thereafter the knee became extremely painful.  Came to the emergency room for further violation.  No swelling.  Ambulates normally on the leg.  No limp.  No known trauma.          Review of patient's allergies indicates:   Allergen Reactions    Lantus [insulin glargine] Swelling     Face swelling and itching.    Penicillins Rash     Past Medical History:   Diagnosis Date    Arthritis     Asthma     Diabetes mellitus     GERD (gastroesophageal reflux disease)     Hypertension     Neuropathy      Past Surgical History:   Procedure Laterality Date    TUBAL LIGATION       Family History   Problem Relation Age of Onset    Cancer Mother     Cancer Father      Social History   Substance Use Topics    Smoking status: Never Smoker    Smokeless tobacco: Never Used      Comment: Stay smoke free.    Alcohol use No     Review of Systems   Constitutional: Negative.    HENT: Negative.    Respiratory: Negative.    Cardiovascular: Negative.    Gastrointestinal: Negative.    Genitourinary: Negative.    Musculoskeletal: Negative.         Positive right knee pain.   Skin: Negative.    Neurological: Negative.    All other systems reviewed and are negative.      Physical Exam     Initial Vitals [06/01/18 0926]   BP Pulse Resp Temp SpO2   136/73 99 20 98.3 °F (36.8 °C) 100 %      MAP       94         Physical Exam    Nursing note reviewed.  Constitutional: She appears well-developed and well-nourished.   HENT:   Head: Normocephalic and atraumatic.   Eyes: Pupils " are equal, round, and reactive to light.   Cardiovascular: Normal rate, regular rhythm and normal heart sounds.   Pulmonary/Chest: Breath sounds normal.   Abdominal: Soft.   Musculoskeletal: Normal range of motion.   Right knee examination reveals negative Lockman.  No tenderness palpation at the joint line medial lateral or anteriorly.  Normal anatomy.  Normal size compared with the opposite knee.  Full range of motion.  No crepitus.  Normal exam.   Neurological: She is alert and oriented to person, place, and time. She has normal strength.   Skin: Skin is warm. Capillary refill takes less than 2 seconds.   Psychiatric: She has a normal mood and affect. Thought content normal.         ED Course   Procedures  Labs Reviewed - No data to display          Medical Decision Making:   Differential Diagnosis:   Strain, fracture, osteoporosis, gout, internal arrangement.                      Clinical Impression:   The encounter diagnosis was Internal derangement of multiple sites of right knee.    No orders to display       Disposition:   Disposition: Discharged  Condition: Stable                        Darnell Merida MD  06/01/18 0941

## 2018-06-06 ENCOUNTER — HOSPITAL ENCOUNTER (EMERGENCY)
Facility: HOSPITAL | Age: 58
Discharge: HOME | End: 2018-06-06
Attending: EMERGENCY MEDICINE
Payer: MEDICAID

## 2018-06-06 VITALS
TEMPERATURE: 98 F | WEIGHT: 271 LBS | RESPIRATION RATE: 20 BRPM | BODY MASS INDEX: 43.55 KG/M2 | OXYGEN SATURATION: 97 % | HEIGHT: 66 IN | SYSTOLIC BLOOD PRESSURE: 132 MMHG | HEART RATE: 94 BPM | DIASTOLIC BLOOD PRESSURE: 85 MMHG

## 2018-06-06 DIAGNOSIS — G89.29 CHRONIC PAIN OF RIGHT KNEE: Primary | ICD-10-CM

## 2018-06-06 DIAGNOSIS — M25.561 CHRONIC PAIN OF RIGHT KNEE: Primary | ICD-10-CM

## 2018-06-06 LAB — POCT GLUCOSE: 283 MG/DL (ref 70–110)

## 2018-06-06 PROCEDURE — 99283 EMERGENCY DEPT VISIT LOW MDM: CPT | Mod: 25

## 2018-06-06 PROCEDURE — 96372 THER/PROPH/DIAG INJ SC/IM: CPT

## 2018-06-06 PROCEDURE — 63600175 PHARM REV CODE 636 W HCPCS: Performed by: PHYSICIAN ASSISTANT

## 2018-06-06 PROCEDURE — 82962 GLUCOSE BLOOD TEST: CPT

## 2018-06-06 RX ORDER — LIDOCAINE 50 MG/G
1 PATCH TOPICAL DAILY
Qty: 7 PATCH | Refills: 0 | Status: SHIPPED | OUTPATIENT
Start: 2018-06-06 | End: 2018-06-13

## 2018-06-06 RX ORDER — KETOROLAC TROMETHAMINE 30 MG/ML
30 INJECTION, SOLUTION INTRAMUSCULAR; INTRAVENOUS
Status: COMPLETED | OUTPATIENT
Start: 2018-06-06 | End: 2018-06-06

## 2018-06-06 RX ADMIN — KETOROLAC TROMETHAMINE 30 MG: 30 INJECTION, SOLUTION INTRAMUSCULAR at 11:06

## 2018-06-07 ENCOUNTER — NURSE TRIAGE (OUTPATIENT)
Dept: ADMINISTRATIVE | Facility: CLINIC | Age: 58
End: 2018-06-07

## 2018-06-07 ENCOUNTER — TELEPHONE (OUTPATIENT)
Dept: ORTHOPEDICS | Facility: CLINIC | Age: 58
End: 2018-06-07

## 2018-06-07 NOTE — TELEPHONE ENCOUNTER
Reason for Disposition   Caller has NON-URGENT medication question about med that PCP prescribed and triager unable to answer question    Protocols used: ST MEDICATION QUESTION CALL-A-AH    Pt was seen in ED yesterday, given lidocaine patches and needs a prior authorization. Unable to route to PCP.

## 2018-06-07 NOTE — TELEPHONE ENCOUNTER
Left message for patient to return call to office.    ----- Message from Janessa Palafox sent at 6/7/2018  9:00 AM CDT -----  Contact: self - [phone]  Patient is requesting a call back regarding, she was in the ER last night, and they told her to see if you can see her today. Please advise

## 2018-06-07 NOTE — ED PROVIDER NOTES
Encounter Date: 6/6/2018       History     Chief Complaint   Patient presents with    Knee Pain     onset of 3 weeks + swelling pt seen PCP last monday for same complaint was given crutches and a braces pt reports did not help     57 year old female with PMH of hypertension, neuropathy, arthritis, diabetes presents to the ED complaining of right knee pain times 2 months.  Patient has been seen multiple times in the ED and by Orthopedics for the same complaint.  No diagnosis other than arthritis has been found.  Patient has been prescribed multiple medications for this complaint.  Patient states no medications are helping, pain has not improved.  Patient states she was given a brace and crutches and instructed to be nonweightbearing, presents today without brace or crutches.      The history is provided by the patient. No  was used.     Review of patient's allergies indicates:   Allergen Reactions    Lantus [insulin glargine] Swelling     Face swelling and itching.    Penicillins Rash     Past Medical History:   Diagnosis Date    Arthritis     Asthma     Diabetes mellitus     GERD (gastroesophageal reflux disease)     Hypertension     Neuropathy      Past Surgical History:   Procedure Laterality Date    TUBAL LIGATION       Family History   Problem Relation Age of Onset    Cancer Mother     Cancer Father      Social History   Substance Use Topics    Smoking status: Never Smoker    Smokeless tobacco: Never Used      Comment: Stay smoke free.    Alcohol use No     Review of Systems   Constitutional: Negative for fever.   HENT: Negative for sore throat.    Respiratory: Negative for shortness of breath.    Cardiovascular: Negative for chest pain.   Gastrointestinal: Negative for nausea.   Genitourinary: Negative for dysuria.   Musculoskeletal: Positive for arthralgias (Right knee). Negative for back pain.   Skin: Negative for rash.   Neurological: Negative for weakness.   Hematological:  Does not bruise/bleed easily.   All other systems reviewed and are negative.      Physical Exam     Initial Vitals [06/06/18 2224]   BP Pulse Resp Temp SpO2   (!) 165/73 96 16 97.8 °F (36.6 °C) 98 %      MAP       103.67         Physical Exam    Nursing note and vitals reviewed.  Constitutional: Vital signs are normal. She appears well-developed and well-nourished. No distress.   Patient tearful throughout exam   HENT:   Head: Normocephalic and atraumatic.   Nose: Nose normal.   Eyes: Conjunctivae and lids are normal.   Neck: Normal range of motion and phonation normal.   Cardiovascular: Normal rate.   Pulmonary/Chest: Effort normal. No respiratory distress.   Abdominal: Normal appearance. She exhibits no distension.   Musculoskeletal: Normal range of motion.        Right knee: She exhibits normal range of motion, no swelling, no effusion and no deformity. Tenderness found.   Patient exhibits tenderness to generalized knee with mild palpation. She refused to let me examine further.   Neurological: She is alert and oriented to person, place, and time.   Skin: Skin is warm and dry.   Psychiatric: She has a normal mood and affect. Her speech is normal and behavior is normal. Judgment and thought content normal. Cognition and memory are normal.         ED Course   Procedures  Labs Reviewed   POCT GLUCOSE - Abnormal; Notable for the following:        Result Value    POCT Glucose 283 (*)     All other components within normal limits          No orders to display        Medical Decision Making:   History:   Old Medical Records: I decided to obtain old medical records.  Old Records Summarized: records from clinic visits and records from previous admission(s).  Initial Assessment:   57 year old female with PMH of hypertension, neuropathy, arthritis, diabetes presents to the ED complaining of right knee pain times 2 months.  Patient has been seen multiple times in the ED and by Orthopedics for the same complaint.  No  diagnosis other than arthritis has been found.  Patient has been prescribed multiple medications for this complaint.  Patient states no medications are helping, pain has not improved.  Patient states she was given a brace and crutches and instructed to be nonweightbearing, presents today without brace or crutches.  Physical exam reveals  tenderness to generalized knee with mild palpation. She refused to let me examine further.  Patient has had multiple workups in the last month for same complaint, no new imaging warranted at this time.  Differential Diagnosis:   Arthritis, tendinitis  ED Management:  Discussed with patient that she should continue following up with Orthopedics and follow their recommendations. Patient given Toradol IM in the ED.  And will be discharged with prescription for lidocaine transdermal patches for pain control and instructed to follow up with Orthopedics in 2-3 days.                      Clinical Impression:   The encounter diagnosis was Chronic pain of right knee.                             Vonnie Agee PA-C  06/06/18 7888

## 2018-06-07 NOTE — ED NOTES
Patient identifiers for Mame Kumari checked and correct.  LOC: The patient is awake, alert and aware of environment with an appropriate affect, the patient is oriented x 3 and speaking appropriately.  APPEARANCE: Crying.  Obese.  Patient uncomfortable and in no acute distress, patient is clean and well groomed, patient's clothing are properly fastened.  SKIN: The skin is warm and dry, patient has normal skin turgor and moist mucus membranes, skin intact, no breakdown or brusing noted.  MUSKULOSKELETAL: Patient moving all extremities, no obvious swelling or deformities noted. + pedal pulse.

## 2018-06-08 ENCOUNTER — TELEPHONE (OUTPATIENT)
Dept: ORTHOPEDICS | Facility: CLINIC | Age: 58
End: 2018-06-08

## 2018-06-08 NOTE — TELEPHONE ENCOUNTER
Spoke with patient to schedule urgent appointment. Patient was made aware of date, time, and location.  Verbalized understanding.      ----- Message from Sara Newberry sent at 6/7/2018  4:55 PM CDT -----  Contact: self  Pt called requesting an immediate return call back from Dr. Cruz or MA regarding an ER f/u apt as soon as possible for her knee per ER doctor soon er than July 17. Within a week.  Pt could be reached at [phone]

## 2018-06-11 ENCOUNTER — OFFICE VISIT (OUTPATIENT)
Dept: ORTHOPEDICS | Facility: CLINIC | Age: 58
End: 2018-06-11
Payer: MEDICAID

## 2018-06-11 VITALS — HEIGHT: 66 IN | WEIGHT: 271 LBS | BODY MASS INDEX: 43.55 KG/M2

## 2018-06-11 DIAGNOSIS — G89.29 CHRONIC PAIN OF RIGHT KNEE: Primary | ICD-10-CM

## 2018-06-11 DIAGNOSIS — M25.561 CHRONIC PAIN OF RIGHT KNEE: Primary | ICD-10-CM

## 2018-06-11 PROCEDURE — 99999 PR PBB SHADOW E&M-EST. PATIENT-LVL III: CPT | Mod: PBBFAC,,, | Performed by: ORTHOPAEDIC SURGERY

## 2018-06-11 PROCEDURE — 99213 OFFICE O/P EST LOW 20 MIN: CPT | Mod: PBBFAC,PN | Performed by: ORTHOPAEDIC SURGERY

## 2018-06-11 PROCEDURE — 99213 OFFICE O/P EST LOW 20 MIN: CPT | Mod: S$PBB,,, | Performed by: ORTHOPAEDIC SURGERY

## 2018-06-11 NOTE — PROGRESS NOTES
Subjective:      Patient ID: Mame Kumari is a 57 y.o. female.    Chief Complaint: Pain of the Right Knee    HPI     Follow-up for right knee pain.  The patient reports no improvement.  She complains of pain in her entire right lower extremity centered on her knee. She is unable to describe any specific mechanical symptoms or things that help or make it worse.  Multiple emergency room visits are noted  Review of Systems   Constitution: Negative for fever and weight loss.   HENT: Negative for congestion.    Eyes: Negative for visual disturbance.   Cardiovascular: Negative for chest pain.   Respiratory: Negative for shortness of breath.    Hematologic/Lymphatic: Negative for bleeding problem. Does not bruise/bleed easily.   Skin: Negative for poor wound healing.   Musculoskeletal: Positive for joint pain.   Gastrointestinal: Negative for abdominal pain.   Genitourinary: Negative for dysuria.   Neurological: Negative for seizures.   Psychiatric/Behavioral: Negative for altered mental status.   Allergic/Immunologic: Negative for persistent infections.         Objective:            Ortho/SPM Exam    Overweight woman.  Walks with a limp.  Right knee skin intact  There is allodynia  No definite effusion  Full active range of motion  Stable valgus and varus stress  Neurovascular exam intact in right lower extremity    I reviewed the relevant radiographic images for the patient's condition:  Most recent films are within normal limits for the patient's age        Assessment:       Encounter Diagnosis   Name Primary?    Chronic pain of right knee Yes        intractable knee pain in the absence of any objective findings.  Need to rule out bone marrow edema and osteonecrosis  Plan:       Mame was seen today for pain.    Diagnoses and all orders for this visit:    Chronic pain of right knee        I explained my diagnostic impression and the reasoning behind it in detail, using layman's terms.  Models and/or pictures  were used to help in the explanation.    Walker recommended    MRI    Follow-up thereafter

## 2018-06-21 ENCOUNTER — HOSPITAL ENCOUNTER (OUTPATIENT)
Dept: RADIOLOGY | Facility: HOSPITAL | Age: 58
Discharge: HOME | End: 2018-06-21
Attending: ORTHOPAEDIC SURGERY
Payer: MEDICAID

## 2018-06-21 PROCEDURE — 73721 MRI JNT OF LWR EXTRE W/O DYE: CPT | Mod: 26,RT,, | Performed by: RADIOLOGY

## 2018-06-21 PROCEDURE — 73721 MRI JNT OF LWR EXTRE W/O DYE: CPT | Mod: TC,RT

## 2018-06-25 ENCOUNTER — TELEPHONE (OUTPATIENT)
Dept: ORTHOPEDICS | Facility: CLINIC | Age: 58
End: 2018-06-25

## 2018-06-25 NOTE — TELEPHONE ENCOUNTER
Spoke with patient to inform her of appointment. Patient was made aware of date, time, and location. Advised patient that  will review results at the time of appointment. Verbalized understanding,    ----- Message from Duarte Almonte sent at 6/25/2018 12:20 PM CDT -----  Contact: self/[phone]  Patient is calling to get results from recent test.  Please advise

## 2018-06-25 NOTE — TELEPHONE ENCOUNTER
Spoke with patient to inform her of appointment. Patient was made aware of date, time, and location. Advised patient that  will review results at the time of appointment. Verbalized understanding,    ----- Message from Ladonna Morrow sent at 6/25/2018  9:20 AM CDT -----  Contact: Self/ [phone]  Patient would like to get test results.     Please call and advise.

## 2018-06-26 ENCOUNTER — TELEPHONE (OUTPATIENT)
Dept: ORTHOPEDICS | Facility: CLINIC | Age: 58
End: 2018-06-26

## 2018-06-26 NOTE — TELEPHONE ENCOUNTER
----- Message from Janessa Palafox sent at 6/26/2018  2:29 PM CDT -----  Contact: self / [phone]  Patient is requesting a call back regarding, her knee. Please advise

## 2018-06-27 ENCOUNTER — TELEPHONE (OUTPATIENT)
Dept: ORTHOPEDICS | Facility: CLINIC | Age: 58
End: 2018-06-27

## 2018-06-27 NOTE — TELEPHONE ENCOUNTER
Left message for patient to return call to office.    ----- Message from Lenora Almonte sent at 6/27/2018 10:19 AM CDT -----  Contact: [phone]/ self  Patient requesting to speak with you. She stated that she was coming in to office for shot for knee pain. I informed pt that  is in [place] on Wednesday's. Please advise.

## 2018-07-16 ENCOUNTER — TELEPHONE (OUTPATIENT)
Dept: ORTHOPEDICS | Facility: CLINIC | Age: 58
End: 2018-07-16

## 2018-07-16 NOTE — TELEPHONE ENCOUNTER
Spoke with patient to reschedule appointment to tomorrow. She was made aware of date, time, and location. Verbalized understanding.    ----- Message from Rahel Alejo sent at 7/13/2018  4:43 PM CDT -----  Contact: [phone]/self  Patient states she is returning your call. Please advise.

## 2018-07-17 ENCOUNTER — OFFICE VISIT (OUTPATIENT)
Dept: ORTHOPEDICS | Facility: CLINIC | Age: 58
End: 2018-07-17
Payer: MEDICAID

## 2018-07-17 VITALS — BODY MASS INDEX: 39.71 KG/M2 | WEIGHT: 246 LBS

## 2018-07-17 DIAGNOSIS — M23.91 INTERNAL DERANGEMENT OF RIGHT KNEE: Primary | ICD-10-CM

## 2018-07-17 PROCEDURE — 99999 PR PBB SHADOW E&M-EST. PATIENT-LVL II: CPT | Mod: PBBFAC,,, | Performed by: ORTHOPAEDIC SURGERY

## 2018-07-17 PROCEDURE — 99214 OFFICE O/P EST MOD 30 MIN: CPT | Mod: S$PBB,,, | Performed by: ORTHOPAEDIC SURGERY

## 2018-07-17 PROCEDURE — 99212 OFFICE O/P EST SF 10 MIN: CPT | Mod: PBBFAC,PN | Performed by: ORTHOPAEDIC SURGERY

## 2018-07-17 NOTE — PROGRESS NOTES
Subjective:      Patient ID: Mame Kumari is a 57 y.o. female.    Chief Complaint:  Right knee pain  HPI   Follow-up for right knee pain.  Patient has been treated with injection, oral medication and activity modification.  She complains of persistent diffuse pain concentrated more at the medial joint line. She has catching but no locking.      Review of Systems   Constitution: Negative for fever and weight loss.   HENT: Negative for congestion.    Eyes: Negative for visual disturbance.   Cardiovascular: Negative for chest pain.   Respiratory: Negative for shortness of breath.    Hematologic/Lymphatic: Negative for bleeding problem. Does not bruise/bleed easily.   Skin: Negative for poor wound healing.   Musculoskeletal: Positive for joint pain.   Gastrointestinal: Negative for abdominal pain.   Genitourinary: Negative for dysuria.   Neurological: Negative for seizures.   Psychiatric/Behavioral: Negative for altered mental status.   Allergic/Immunologic: Negative for persistent infections.         Objective:            Ortho/SPM Exam    Right Knee    There were no vitals filed for this visit.    The patient is not in acute distress.   Body habitus is obese.   The patient walks with a limp.  Resisted SLR negative.   The skin over the knee is intact.  Knee effusion trace  Tendernes is located:  Medially  Range of motion- Flexion 125 deg, Extension 0 deg,   Ligament exam:   MCL intact   Lachman intact              Post sag intact    LCL intact  Patellar apprehension negative.  Popliteal cyst negative  Patellar crepitation absent.  Flexion/pinch negative.  Pulses DP present, PT present.  Motor normal 5/5 strength in all tested muscle groups.   Sensory normal.          There were no vitals filed for this visit.    I reviewed the relevant radiographic images for the patient's condition:  Right knee MRI shows medial meniscus tear with patellofemoral degeneration          Assessment:       Encounter Diagnosis   Name  Primary?    Internal derangement of right knee Yes        it is virtually impossible to completely determine what upper portion of the patient's symptoms are patellofemoral and which are meniscal.  Plan:       Mame was seen today for knee pain.    Diagnoses and all orders for this visit:    Internal derangement of right knee        I explained my diagnostic impression and the reasoning behind it in detail, using layman's terms.  Models and/or pictures were used to help in the explanation.    I explained the above-mentioned difficulty in detail to the patient.    Treatment options included nonsurgical management with medication, injections and therapy versus arthroscopy.  The risks and expectations of each were discussed. I specifically explained that the risk of arthroscopy includes the usual surgical risks of serious anesthetic complication, infection, blood clots, but also that of complete failure to relieve symptoms and the development of progressive arthritis requiring further surgery.    I also explained that prescription medications would only be provided for several days after arthroscopic surgery. The patient indicated that she understood and accepted this.    The patient understood and wishes to proceed with a right knee arthroscopy.

## 2018-07-18 ENCOUNTER — TELEPHONE (OUTPATIENT)
Dept: ORTHOPEDICS | Facility: CLINIC | Age: 58
End: 2018-07-18

## 2018-07-18 NOTE — TELEPHONE ENCOUNTER
Spoke with patient to get her scheduled for procedure. Patient was scheduled for August 13. She was scheduled for pre op and post op appointments. Patient was made aware of date, time, and location . Verbalized understanding.    ----- Message from Rahel Alejo sent at 7/18/2018 12:04 PM CDT -----  Contact: [phone]/self  Patient requesting to speak with you regarding scheduling a procedure. Please call and advise.

## 2018-07-19 DIAGNOSIS — M23.91 INTERNAL DERANGEMENT OF KNEE JOINT, RIGHT: Primary | ICD-10-CM

## 2018-07-19 RX ORDER — SODIUM CHLORIDE 0.9 % (FLUSH) 0.9 %
3 SYRINGE (ML) INJECTION
Status: CANCELLED | OUTPATIENT
Start: 2018-07-19

## 2018-07-19 RX ORDER — MUPIROCIN 20 MG/G
OINTMENT TOPICAL
Status: CANCELLED | OUTPATIENT
Start: 2018-07-19

## 2018-07-31 ENCOUNTER — OFFICE VISIT (OUTPATIENT)
Dept: ORTHOPEDICS | Facility: CLINIC | Age: 58
End: 2018-07-31
Payer: MEDICAID

## 2018-07-31 VITALS
WEIGHT: 273 LBS | SYSTOLIC BLOOD PRESSURE: 124 MMHG | HEART RATE: 72 BPM | DIASTOLIC BLOOD PRESSURE: 76 MMHG | BODY MASS INDEX: 44.06 KG/M2

## 2018-07-31 DIAGNOSIS — S83.241D TEAR OF MEDIAL MENISCUS OF RIGHT KNEE, CURRENT, UNSPECIFIED TEAR TYPE, SUBSEQUENT ENCOUNTER: Primary | ICD-10-CM

## 2018-07-31 PROCEDURE — 99999 PR PBB SHADOW E&M-EST. PATIENT-LVL III: CPT | Mod: PBBFAC,,, | Performed by: ORTHOPAEDIC SURGERY

## 2018-07-31 PROCEDURE — 99213 OFFICE O/P EST LOW 20 MIN: CPT | Mod: PBBFAC,PN | Performed by: ORTHOPAEDIC SURGERY

## 2018-07-31 PROCEDURE — 99499 UNLISTED E&M SERVICE: CPT | Mod: S$PBB,,, | Performed by: ORTHOPAEDIC SURGERY

## 2018-07-31 NOTE — H&P
"Subjective:      Patient ID: Mame Kumari is a 57 y.o. female.    Chief Complaint: Pain of the Right Knee and Pre-op Exam   The patient complains of persistent right knee pain and pseudo locking.  She has been treated with injection and oral medication without improvement.    Past Medical History:   Diagnosis Date    Arthritis     Asthma     Diabetes mellitus     GERD (gastroesophageal reflux disease)     Hypertension     Neuropathy      Past Surgical History:   Procedure Laterality Date    TUBAL LIGATION       Social History     Occupational History    Not on file.     Social History Main Topics    Smoking status: Never Smoker    Smokeless tobacco: Never Used      Comment: Stay smoke free.    Alcohol use No    Drug use: No    Sexual activity: Not on file      ROS  Current Outpatient Prescriptions on File Prior to Visit   Medication Sig Dispense Refill    albuterol (ACCUNEB) 0.63 mg/3 mL Nebu Take 3 mLs (0.63 mg total) by nebulization every 6 (six) hours as needed. 100 vial 3    albuterol (ACCUNEB) 1.25 mg/3 mL Nebu       alprazolam (XANAX) 1 MG tablet TK 1 T PO  IN THE EVENING PRN  2    clobetasol (TEMOVATE) 0.05 % cream APPLY SMALL AMOUNT (1-2 GRAMS) TO AFFECTED AREA 2-4 TIMES DAILY AS DIRECTED FOR REDNESS RELIEF.  0    duloxetine (CYMBALTA) 30 MG capsule TK 1 C PO QD  0    furosemide (LASIX) 40 MG tablet       glyBURIDE (DIABETA) 2.5 MG tablet       glyBURIDE (DIABETA) 5 MG tablet TK 1 T PO BID WITH MEALS  2    hydrocodone-acetaminophen 10-325mg (NORCO)  mg Tab TK 1 T PO Q 12 H PRN  0    ketorolac (TORADOL) 10 mg tablet Take 1 tablet (10 mg total) by mouth every 6 (six) hours. 15 tablet 0    LANTUS SOLOSTAR 100 unit/mL (3 mL) InPn pen       latanoprost 0.005 % ophthalmic solution INT 1 GTT IN OU QD HS  6    nitroGLYCERIN (NITROSTAT) 0.4 MG SL tablet       NOVOFINE 32 32 x 1/4 " Ndle       ondansetron (ZOFRAN-ODT) 4 MG TbDL Take 1 tablet (4 mg total) by mouth every 8 (eight) " "hours as needed (Nausea/vomiting). 10 tablet 0    prednisoLONE acetate (PRED FORTE) 1 % DrpS       quetiapine (SEROQUEL) 100 MG Tab Take 100 mg by mouth every evening.       quetiapine (SEROQUEL) 200 MG Tab TK 1 T PO QHS  5    QUEtiapine (SEROQUEL) 50 MG tablet Take 50 mg by mouth every evening.      tramadol (ULTRAM) 50 mg tablet TK 1 T PO BID  2    tramadol 150 mg MP25 Take 150 mg by mouth once daily. 60 each 0    TRUE METRIX GLUCOSE TEST STRIP Strp       ULTICARE PEN NEEDLE 31 gauge x 1/4" Ndle       VENTOLIN HFA 90 mcg/actuation inhaler       ADULT WAL-TUSSIN DM MAX  mg/5 mL Liqd TK 10 ML PO Q 6 H FOR ONE WEEK  0    azithromycin (Z-LIAM) 250 MG tablet TK ONE T PO BID ON DAY 1 THEN TK ONE T PO D FOR 4 DAYS  0    HYDROcodone-acetaminophen (NORCO) 5-325 mg per tablet Take 1 tablet by mouth every 6 (six) hours as needed for Pain. 4 tablet 0    hydrocodone-acetaminophen 5-325mg (NORCO) 5-325 mg per tablet Take 1 tablet by mouth every 6 (six) hours as needed for Pain.      lisinopril (PRINIVIL,ZESTRIL) 5 MG tablet Take 1 tablet (5 mg total) by mouth once daily. 30 tablet 3    naproxen (NAPROSYN) 500 MG tablet Take 1 tablet (500 mg total) by mouth 2 (two) times daily with meals. 30 tablet 0    pantoprazole (PROTONIX) 20 MG tablet Take 1 tablet (20 mg total) by mouth once daily. 30 tablet 0    promethazine-codeine 6.25-10 mg/5 ml (PHENERGAN WITH CODEINE) 6.25-10 mg/5 mL syrup   0    tiotropium (SPIRIVA) 18 mcg inhalation capsule Inhale 1 capsule (18 mcg total) into the lungs once daily. 30 capsule 3     No current facility-administered medications on file prior to visit.      Review of patient's allergies indicates:   Allergen Reactions    Lantus [insulin glargine] Swelling     Face swelling and itching.    Penicillins Rash         Objective:      Vitals:    07/31/18 0848   BP: 124/76   Pulse: 72   Weight: 123.8 kg (273 lb)     Ortho Exam     Appears alert and comfortable  Heart regular rate and " rhythm  Chest clear    Right knee-    Walks with a limp.  Right knee skin intact  There is allodynia  No definite effusion  Full active range of motion  Stable valgus and varus stress  Neurovascular exam intact in right lower extremity    MRI shows medial meniscus tear      Assessment:       1. Tear of medial meniscus of right knee, current, unspecified tear type, subsequent encounter          Plan:       Right knee arthroscopy was recommended.  The nature of the procedures explained in detail. The usual risks including anesthetic complications and infection were discussed. The possibility persistent and progressive arthritic symptoms necessitating further treatment and surgery was also explained.  The possibility of nonsurgical care and expectations was reviewed.  The patient understands and wishes to proceed.

## 2018-07-31 NOTE — PROGRESS NOTES
The patient was seen for preoperative evaluation prior to right knee arthroscopy.  No contraindication surgery were found   I explained the nature of the procedure. I explained the usual risks associated with the procedure as well as limitations of the procedure in patients her age.  I specifically explained that arthritic symptoms may progress despite arthroscopy and further intervention and surgery could be needed soon afterwards.  She understood and accepted this.

## 2018-08-13 PROBLEM — M23.91 INTERNAL DERANGEMENT OF KNEE JOINT, RIGHT: Status: ACTIVE | Noted: 2018-08-13

## 2018-08-17 ENCOUNTER — TELEPHONE (OUTPATIENT)
Dept: ORTHOPEDICS | Facility: CLINIC | Age: 58
End: 2018-08-17

## 2018-08-20 ENCOUNTER — TELEPHONE (OUTPATIENT)
Dept: ORTHOPEDICS | Facility: CLINIC | Age: 58
End: 2018-08-20

## 2018-08-20 NOTE — TELEPHONE ENCOUNTER
I will not administer any more narcotics for this procedure. The patient is advised to use over-the-counter medication of her choice.  Please let her know

## 2018-08-20 NOTE — TELEPHONE ENCOUNTER
----- Message from Cele Craig sent at 8/20/2018  1:42 PM CDT -----  Contact: [phone]/self  Refill request for HYDROcodone-acetaminophen (NORCO)  mg per tablet  Pharmacy: [facility] - [place] [id number]

## 2018-08-22 NOTE — TELEPHONE ENCOUNTER
Spoke with patient to inform her that  said that he will not prescribe anymore narcotics and she should get something over the counter. Patient verbalized understanding and then stated that she will speak with him at follow up appointment.

## 2018-08-27 ENCOUNTER — OFFICE VISIT (OUTPATIENT)
Dept: ORTHOPEDICS | Facility: CLINIC | Age: 58
End: 2018-08-27
Payer: MEDICAID

## 2018-08-27 VITALS — WEIGHT: 271 LBS | HEIGHT: 66 IN | BODY MASS INDEX: 43.55 KG/M2

## 2018-08-27 DIAGNOSIS — S83.241D TEAR OF MEDIAL MENISCUS OF RIGHT KNEE, CURRENT, UNSPECIFIED TEAR TYPE, SUBSEQUENT ENCOUNTER: Primary | ICD-10-CM

## 2018-08-27 DIAGNOSIS — M17.11 PRIMARY OSTEOARTHRITIS OF RIGHT KNEE: ICD-10-CM

## 2018-08-27 PROCEDURE — 99024 POSTOP FOLLOW-UP VISIT: CPT | Mod: ,,, | Performed by: ORTHOPAEDIC SURGERY

## 2018-08-27 PROCEDURE — 99999 PR PBB SHADOW E&M-EST. PATIENT-LVL III: CPT | Mod: PBBFAC,,, | Performed by: ORTHOPAEDIC SURGERY

## 2018-08-27 PROCEDURE — 99213 OFFICE O/P EST LOW 20 MIN: CPT | Mod: PBBFAC,PN | Performed by: ORTHOPAEDIC SURGERY

## 2018-08-27 RX ORDER — HYDROCODONE BITARTRATE AND ACETAMINOPHEN 10; 325 MG/1; MG/1
1 TABLET ORAL EVERY 12 HOURS PRN
Qty: 30 TABLET | Refills: 0 | OUTPATIENT
Start: 2018-08-27 | End: 2018-11-17

## 2018-08-27 NOTE — PROGRESS NOTES
Subjective:      Patient ID: Mame Kumari is a 57 y.o. female.    Chief Complaint: Post-op Evaluation of the Right Knee    HPI  Two weeks status post right knee arthroscopy.  Patient reports she is reasonably comfortable.  ROS      Objective:            Ortho/SPM Exam  Appears comfortable  Mild limp  Portals are well healed  Minimal residual swelling  Range of motion nearly full        Assessment:       Encounter Diagnoses   Name Primary?    Tear of medial meniscus of right knee, current, unspecified tear type, subsequent encounter Yes    Primary osteoarthritis of right knee           Plan:       Mame was seen today for post-op evaluation.    Diagnoses and all orders for this visit:    Tear of medial meniscus of right knee, current, unspecified tear type, subsequent encounter    Primary osteoarthritis of right knee      I reviewed the arthroscopic procedures with the patient and explained the significance of the degenerative changes.    Weight loss recommend    Physiotherapy    Wean off prescription pain medication  ded      Natural history of knee degeneration explained in detail

## 2018-09-17 PROBLEM — Z74.09 IMPAIRED FUNCTIONAL MOBILITY, BALANCE, GAIT, AND ENDURANCE: Status: ACTIVE | Noted: 2018-09-17

## 2018-09-17 PROBLEM — R29.898 DECREASED STRENGTH INVOLVING KNEE JOINT: Status: ACTIVE | Noted: 2018-09-17

## 2018-09-17 PROBLEM — M25.669 DECREASED RANGE OF MOTION (ROM) OF KNEE: Status: ACTIVE | Noted: 2018-09-17

## 2018-09-24 ENCOUNTER — TELEPHONE (OUTPATIENT)
Dept: ORTHOPEDICS | Facility: CLINIC | Age: 58
End: 2018-09-24

## 2018-09-24 NOTE — TELEPHONE ENCOUNTER
Spoke with pt in regards to earlier appt . I explained time , date , and location . Verbalized understanding .  ----- Message from Ladonna Morrow sent at 9/24/2018  9:44 AM CDT -----  Contact: Self/ [phone]  Patient called in requesting to speak with you. Patient prefers to speak with a nurse.     Please call and advise.

## 2018-11-06 ENCOUNTER — OFFICE VISIT (OUTPATIENT)
Dept: ORTHOPEDICS | Facility: CLINIC | Age: 58
End: 2018-11-06

## 2018-11-06 VITALS — HEIGHT: 66 IN | WEIGHT: 271 LBS | BODY MASS INDEX: 43.55 KG/M2

## 2018-11-06 DIAGNOSIS — M17.11 PRIMARY OSTEOARTHRITIS OF RIGHT KNEE: Primary | ICD-10-CM

## 2018-11-06 PROCEDURE — 99213 OFFICE O/P EST LOW 20 MIN: CPT | Mod: PBBFAC,PN | Performed by: ORTHOPAEDIC SURGERY

## 2018-11-06 PROCEDURE — 99213 OFFICE O/P EST LOW 20 MIN: CPT | Mod: S$PBB,,, | Performed by: ORTHOPAEDIC SURGERY

## 2018-11-06 PROCEDURE — 99999 PR PBB SHADOW E&M-EST. PATIENT-LVL III: CPT | Mod: PBBFAC,,, | Performed by: ORTHOPAEDIC SURGERY

## 2018-11-06 NOTE — PROGRESS NOTES
Subjective:      Patient ID: Mame Kumari is a 58 y.o. female.    Chief Complaint: Pain and Injury of the Right Knee    HPI patient seen in follow-up for right knee pain. She [place] and has some increased pain and swelling.    Review of Systems   Constitution: Negative for fever and weight loss.   HENT: Negative for congestion.    Eyes: Negative for visual disturbance.   Cardiovascular: Negative for chest pain.   Respiratory: Negative for shortness of breath.    Hematologic/Lymphatic: Negative for bleeding problem. Does not bruise/bleed easily.   Skin: Negative for poor wound healing.   Musculoskeletal: Positive for joint pain.   Gastrointestinal: Negative for abdominal pain.   Genitourinary: Negative for dysuria.   Neurological: Negative for seizures.   Psychiatric/Behavioral: Negative for altered mental status.   Allergic/Immunologic: Negative for persistent infections.         Objective:            Ortho/SPM Exam  Right knee    [unfilled]    The patient is not in acute distress.   Sclerae normal  Body habitus is obese.  none   The patient walks without a limp.  Hip irritability  negative.   The skin over the knee is intact.  Knee effusion trace  Tendernes is located none  Range of motion- Flexion 120 deg, Extension 0 deg,   Ligament laxity exam:   MCL intact   Lachman negative   Post sag negative    LCL 0  Patellar apprehension negative.  Popliteal cyst negative  Patellar crepitation absent.  Flexion/pinch not applicable  Pulses DP intact, PT intact.  Motor normal 5/5 strength in all tested muscle groups.   Sensory normal.        Assessment:       Encounter Diagnosis   Name Primary?    Primary osteoarthritis of right knee Yes        The patient's presentation, arthroscopic findings and clinical course are more suggestive of osteoarthritis that any traumatic condition      Plan:       Mame was seen today for pain and injury.    Diagnoses and  all orders for this visit:    Primary osteoarthritis of right knee        I explained my diagnostic impression and the reasoning behind it in detail, using layman's terms.  Models and/or pictures were used to help in the explanation.    Hinged brace applied with usage instructions    Physical therapy    Over-the-counter analgesics

## 2018-11-16 ENCOUNTER — TELEPHONE (OUTPATIENT)
Dept: ORTHOPEDICS | Facility: CLINIC | Age: 58
End: 2018-11-16

## 2018-11-16 NOTE — TELEPHONE ENCOUNTER
----- Message from Lenora Almonte sent at 11/16/2018 12:46 PM CST -----  Contact: [phone]/ self   Patient would like refill of the following medication sent to [facility] - [place], [place] - [address] . Please advise.     1. HYDROcodone-acetaminophen (NORCO)  mg per tablet  Sig: Take 1 tablet by mouth every 12 (twelve) hours as needed for Pain.    Pt stated she's experiencing severe knee pain, and really needs this medication to relieve.

## 2018-11-16 NOTE — TELEPHONE ENCOUNTER
Spoke with patient to inform her that  recommends OTC medication only. Advised her that she may check with her PCP for refills. Verbalized understanding.

## 2018-11-16 NOTE — TELEPHONE ENCOUNTER
I will not prescribe pain medication for this condition. I recommend over-the-counter medication only.

## 2018-11-17 ENCOUNTER — HOSPITAL ENCOUNTER (EMERGENCY)
Facility: HOSPITAL | Age: 58
Discharge: HOME | End: 2018-11-17
Attending: FAMILY MEDICINE
Payer: MEDICAID

## 2018-11-17 VITALS
DIASTOLIC BLOOD PRESSURE: 78 MMHG | RESPIRATION RATE: 20 BRPM | WEIGHT: 270 LBS | HEIGHT: 66 IN | TEMPERATURE: 98 F | SYSTOLIC BLOOD PRESSURE: 157 MMHG | HEART RATE: 99 BPM | BODY MASS INDEX: 43.39 KG/M2 | OXYGEN SATURATION: 98 %

## 2018-11-17 DIAGNOSIS — M23.91 INTERNAL DERANGEMENT OF RIGHT KNEE: Primary | ICD-10-CM

## 2018-11-17 DIAGNOSIS — M54.50 LUMBAR BACK PAIN: ICD-10-CM

## 2018-11-17 DIAGNOSIS — G56.91 NEUROPATHY OF UPPER EXTREMITY, RIGHT: ICD-10-CM

## 2018-11-17 PROCEDURE — 25000003 PHARM REV CODE 250: Performed by: NURSE PRACTITIONER

## 2018-11-17 PROCEDURE — 63600175 PHARM REV CODE 636 W HCPCS: Performed by: NURSE PRACTITIONER

## 2018-11-17 PROCEDURE — 99284 EMERGENCY DEPT VISIT MOD MDM: CPT | Mod: 25

## 2018-11-17 PROCEDURE — 96372 THER/PROPH/DIAG INJ SC/IM: CPT

## 2018-11-17 RX ORDER — TIZANIDINE 4 MG/1
4 TABLET ORAL EVERY 6 HOURS PRN
Qty: 20 TABLET | Refills: 0 | Status: SHIPPED | OUTPATIENT
Start: 2018-11-17

## 2018-11-17 RX ORDER — GABAPENTIN 300 MG/1
CAPSULE ORAL
Qty: 60 CAPSULE | Refills: 1 | Status: SHIPPED | OUTPATIENT
Start: 2018-11-17 | End: 2019-10-15 | Stop reason: ALTCHOICE

## 2018-11-17 RX ORDER — HYDROCODONE BITARTRATE AND ACETAMINOPHEN 5; 325 MG/1; MG/1
2 TABLET ORAL
Status: COMPLETED | OUTPATIENT
Start: 2018-11-17 | End: 2018-11-17

## 2018-11-17 RX ORDER — DICLOFENAC SODIUM 10 MG/G
2 GEL TOPICAL 4 TIMES DAILY PRN
Qty: 100 G | Refills: 0 | Status: SHIPPED | OUTPATIENT
Start: 2018-11-17 | End: 2019-11-09

## 2018-11-17 RX ORDER — KETOROLAC TROMETHAMINE 30 MG/ML
30 INJECTION, SOLUTION INTRAMUSCULAR; INTRAVENOUS
Status: COMPLETED | OUTPATIENT
Start: 2018-11-17 | End: 2018-11-17

## 2018-11-17 RX ORDER — HYDROCODONE BITARTRATE AND ACETAMINOPHEN 5; 325 MG/1; MG/1
1 TABLET ORAL EVERY 4 HOURS PRN
Qty: 12 TABLET | Refills: 0 | Status: SHIPPED | OUTPATIENT
Start: 2018-11-17 | End: 2019-09-18

## 2018-11-17 RX ORDER — ORPHENADRINE CITRATE 30 MG/ML
60 INJECTION INTRAMUSCULAR; INTRAVENOUS
Status: COMPLETED | OUTPATIENT
Start: 2018-11-17 | End: 2018-11-17

## 2018-11-17 RX ADMIN — KETOROLAC TROMETHAMINE 30 MG: 30 INJECTION, SOLUTION INTRAMUSCULAR at 09:11

## 2018-11-17 RX ADMIN — ORPHENADRINE CITRATE 60 MG: 30 INJECTION INTRAMUSCULAR; INTRAVENOUS at 09:11

## 2018-11-17 RX ADMIN — HYDROCODONE BITARTRATE AND ACETAMINOPHEN 2 TABLET: 5; 325 TABLET ORAL at 09:11

## 2018-11-18 NOTE — ED PROVIDER NOTES
Encounter Date: 11/17/2018       History     Chief Complaint   Patient presents with    Leg Pain     patient c/o right leg pain that has been going on for a couple of months, reports she needs a shot for pain    Back Pain    Arm Pain     58-year-old female here today with complaints of right knee pain, right-sided low back pain, and right upper extremity pain. Patient reports that she had a fall in September [place] this [place], which is when she injured her right knee and exacerbated her chronic back pain.  Review of patient's recent MRI of her knee showed a horizontal tear of the medial meniscus as well as patellofemoral cartilage loss.  Patient also reports having 2 bulging discs to her lumbar spine.  Patient also reporting new onset numbness/pain to her right hand/wrist that radiates to her shoulder.  Patient denies any neck pain. Patient reports that she has discussed this in the past with her orthopedist and was told that it was cervical radiculopathy.  Patient reports that she has been taking Tylenol for the pain without any symptom relief.          Review of patient's allergies indicates:   Allergen Reactions    Lantus [insulin glargine] Swelling     Face swelling and itching.    Penicillins Rash     Past Medical History:   Diagnosis Date    Arthritis     Asthma     CHF (congestive heart failure)     Diabetes mellitus     GERD (gastroesophageal reflux disease)     Hypertension     Neuropathy      Past Surgical History:   Procedure Laterality Date    ARTHROSCOPIC CHONDROPLASTY OF KNEE JOINT  8/13/2018    Procedure: ARTHROSCOPY, KNEE, WITH CHONDROPLASTY;  Surgeon: Jairo Cruz MD;  [facility];  Service: Orthopedics;;    ARTHROSCOPY, KNEE, WITH CHONDROPLASTY  8/13/2018    [facility]    ARTHROSCOPY, KNEE, WITH MENISCECTOMY Right 8/13/2018    [facility]    HAND SURGERY Left     KNEE ARTHROSCOPY W/ MENISCECTOMY  Right 8/13/2018    Procedure: ARTHROSCOPY, KNEE, WITH MENISCECTOMY;  Surgeon: Jairo Crzu MD;  Location: [facility];  Service: Orthopedics;  Laterality: Right;    TUBAL LIGATION       Family History   Problem Relation Age of Onset    Cancer Mother     Cancer Father      Social History     Tobacco Use    Smoking status: Never Smoker    Smokeless tobacco: Never Used    Tobacco comment: Stay smoke free.   Substance Use Topics    Alcohol use: No    Drug use: No     Review of Systems   Cardiovascular:        Hx of HTN and CHF.    Endocrine:        Hx of DM   Musculoskeletal: Positive for back pain, gait problem and joint swelling. Negative for neck pain.   Skin: Negative for color change.   Neurological: Positive for numbness.   All other systems reviewed and are negative.      Physical Exam     Initial Vitals [11/17/18 1955]   BP Pulse Resp Temp SpO2   (!) 150/80 99 18 97.8 °F (36.6 °C) 98 %      MAP       --         Physical Exam    Nursing note and vitals reviewed.  Constitutional: She appears well-developed.   Obese, uncomfortable appearing.    HENT:   Head: Normocephalic and atraumatic.   Right Ear: External ear normal.   Left Ear: External ear normal.   Nose: Nose normal.   Mouth/Throat: Oropharynx is clear and moist.   Eyes: Conjunctivae and EOM are normal.   Neck: Normal range of motion.   Cardiovascular: Intact distal pulses.   Pulmonary/Chest: No respiratory distress.   Respirations are even and non-labored.    Musculoskeletal:   Right-sided lumbar paraspinal  tenderness without lumbar vertebral tenderness, step-off, or crepitus.  No obvious joint effusion to right knee with decreased flexion.  No joint laxity.  Antalgic gait.  No right elbow, right wrist, or right hand swelling or deformity.  Full range of motion of all.  Negative Phalen's.   Neurological: She is alert and oriented to person, place, and time.   Skin: Skin is warm and dry. No erythema.   Psychiatric: She has a normal mood and affect.  Her behavior is normal. Judgment and thought content normal.         ED Course   Procedures  Labs Reviewed - No data to display       Imaging Results    None          Medical Decision Making:   ED Management:  Patient with recent injury of right knee as well as chronic back pain and and radicular pain of the right upper extremity.  Patient has multiple comorbidities including diabetes.  Patient given medication for pain today and is to follow up with her orthopedist.  Findings, treatment, and need for follow-up were reviewed with patient prior to discharge. Patient verbalized agreement and understanding of treatment plan.                      Clinical Impression:   1.  Internal derangement of right knee.  2.  Lumbar back pain.  3.  Neuropathy of upper extremity, right.    Disposition:   Disposition: Discharged                        Shayy Glover NP  11/17/18 2054

## 2018-12-13 ENCOUNTER — TELEPHONE (OUTPATIENT)
Dept: ORTHOPEDICS | Facility: CLINIC | Age: 58
End: 2018-12-13

## 2018-12-13 NOTE — TELEPHONE ENCOUNTER
Spoke with patient and she stated that she is on her way to the ER. Verbalized understanding and advised her that we will give her a call on tomorrow.  ----- Message from Caitlin Collado sent at 12/13/2018 10:13 AM CST -----  Contact: self, [phone]  Patient states she was up the last 3 nights with knee pain. States medication is not helping. Please advise.

## 2019-01-02 ENCOUNTER — TELEPHONE (OUTPATIENT)
Dept: ORTHOPEDICS | Facility: CLINIC | Age: 59
End: 2019-01-02

## 2019-01-02 NOTE — TELEPHONE ENCOUNTER
Left message for patient in regards to severe knee pain , left call back number .   ----- Message from Papo Ward sent at 1/2/2019  8:37 AM CST -----  Contact: self/[phone]  She has severe knee pain and would like to see if she can have something called in?

## 2019-04-30 ENCOUNTER — OFFICE VISIT (OUTPATIENT)
Dept: ORTHOPEDICS | Facility: CLINIC | Age: 59
End: 2019-04-30
Payer: MEDICAID

## 2019-04-30 VITALS — BODY MASS INDEX: 43.39 KG/M2 | WEIGHT: 270 LBS | HEIGHT: 66 IN

## 2019-04-30 DIAGNOSIS — M17.11 PRIMARY OSTEOARTHRITIS OF RIGHT KNEE: Primary | ICD-10-CM

## 2019-04-30 PROCEDURE — 99213 OFFICE O/P EST LOW 20 MIN: CPT | Mod: S$PBB,,, | Performed by: ORTHOPAEDIC SURGERY

## 2019-04-30 PROCEDURE — 99213 PR OFFICE/OUTPT VISIT, EST, LEVL III, 20-29 MIN: ICD-10-PCS | Mod: S$PBB,,, | Performed by: ORTHOPAEDIC SURGERY

## 2019-04-30 PROCEDURE — 99999 PR PBB SHADOW E&M-EST. PATIENT-LVL III: CPT | Mod: PBBFAC,,, | Performed by: ORTHOPAEDIC SURGERY

## 2019-04-30 PROCEDURE — 99213 OFFICE O/P EST LOW 20 MIN: CPT | Mod: PBBFAC,PN | Performed by: ORTHOPAEDIC SURGERY

## 2019-04-30 PROCEDURE — 99999 PR PBB SHADOW E&M-EST. PATIENT-LVL III: ICD-10-PCS | Mod: PBBFAC,,, | Performed by: ORTHOPAEDIC SURGERY

## 2019-04-30 RX ORDER — TRAMADOL HYDROCHLORIDE 50 MG/1
50 TABLET ORAL EVERY 12 HOURS PRN
Qty: 60 TABLET | Refills: 0 | Status: SHIPPED | OUTPATIENT
Start: 2019-04-30 | End: 2019-11-09

## 2019-04-30 NOTE — PROGRESS NOTES
Subjective:      Patient ID: Mame Kumari is a 58 y.o. female.    Chief Complaint: Pain of the Right Knee    HPI follow-up for osteoarthritis.  Patient complains of medial pain and tenderness. Limits ambulation.    Review of Systems   Constitution: Negative for fever and weight loss.   HENT: Negative for congestion.    Eyes: Negative for visual disturbance.   Cardiovascular: Negative for chest pain.   Respiratory: Negative for shortness of breath.    Hematologic/Lymphatic: Negative for bleeding problem. Does not bruise/bleed easily.   Skin: Negative for poor wound healing.   Musculoskeletal: Positive for joint pain.   Gastrointestinal: Negative for abdominal pain.   Genitourinary: Negative for dysuria.   Neurological: Negative for seizures.   Psychiatric/Behavioral: Negative for altered mental status.   Allergic/Immunologic: Negative for persistent infections.         Objective:            Ortho/SPM Exam    Right knee    [unfilled]    The patient is not in acute distress.   Sclerae normal  Body habitus is obese.  Respiratory distress:  none   The patient walks with a limp.  Hip irritability  negative.   The skin over the knee is intact.  Knee effusion 0  Tendernes is located medial joint line  Range of motion- Flexion 130 deg, Extension 0 deg,   Ligament laxity exam:   MCL trace   Lachman 0   Post sag  0    LCL 0  Patellar apprehension negative.  Popliteal cyst negative  Patellar crepitation absent.  Flexion/pinch negative  Pulses DP present, PT present.  Motor normal 5/5 strength in all tested muscle groups.   Sensory normal.      Assessment:       Encounter Diagnosis   Name Primary?    Primary osteoarthritis of right knee Yes        the patient may have some degree of acute bone bruising associated with the  Plan:       Mame was seen today for pain.    Diagnoses and all orders for this visit:    Primary osteoarthritis of right knee      I explained my diagnostic impression and the reasoning behind it in  detail, using layman's terms.  Models and/or pictures were used to help in the explanation.    Weight loss recommended    Physical therapy    One refill of tramadol-no refills    X-ray follow-up

## 2019-05-30 ENCOUNTER — HOSPITAL ENCOUNTER (EMERGENCY)
Facility: HOSPITAL | Age: 59
Discharge: HOME | End: 2019-05-31
Attending: EMERGENCY MEDICINE
Payer: MEDICAID

## 2019-05-30 DIAGNOSIS — M79.674 PAIN IN TOE OF RIGHT FOOT: Primary | ICD-10-CM

## 2019-05-30 PROCEDURE — 99283 EMERGENCY DEPT VISIT LOW MDM: CPT | Mod: ER

## 2019-05-31 VITALS
OXYGEN SATURATION: 98 % | HEART RATE: 87 BPM | TEMPERATURE: 98 F | BODY MASS INDEX: 43.55 KG/M2 | WEIGHT: 271 LBS | DIASTOLIC BLOOD PRESSURE: 88 MMHG | RESPIRATION RATE: 18 BRPM | SYSTOLIC BLOOD PRESSURE: 170 MMHG | HEIGHT: 66 IN

## 2019-05-31 RX ORDER — CIPROFLOXACIN 500 MG/1
500 TABLET ORAL 2 TIMES DAILY
Qty: 20 TABLET | Refills: 0 | Status: SHIPPED | OUTPATIENT
Start: 2019-05-31 | End: 2019-06-10

## 2019-05-31 NOTE — ED PROVIDER NOTES
Encounter Date: 5/30/2019       History     Chief Complaint   Patient presents with    Toe Pain     Patient reports pain and swelling to right big toe, she reports she is diabetic and is worried about it getting infected, reports she only noticed today.     The history is provided by the patient.   Foot Injury    The incident occurred at home. The injury mechanism is unknown. The pain is present in the right toes. The quality of the pain is described as throbbing. The pain is at a severity of 4/10. The pain has been fluctuating since onset. Pertinent negatives include no numbness, no inability to bear weight, no loss of motion, no muscle weakness, no loss of sensation and no tingling. She reports no foreign bodies present. The symptoms are aggravated by bearing weight.     Review of patient's allergies indicates:   Allergen Reactions    Lantus [insulin glargine] Swelling     Face swelling and itching.    Penicillins Rash     Past Medical History:   Diagnosis Date    Arthritis     Asthma     CHF (congestive heart failure)     Diabetes mellitus     GERD (gastroesophageal reflux disease)     Hypertension     Neuropathy      Past Surgical History:   Procedure Laterality Date    ARTHROSCOPY, KNEE, WITH CHONDROPLASTY  8/13/2018    [facility]    ARTHROSCOPY, KNEE, WITH MENISCECTOMY Right 8/13/2018    [facility] at [facility]    HAND SURGERY Left     TUBAL LIGATION       Family History   Problem Relation Age of Onset    Cancer Mother     Cancer Father      Social History     Tobacco Use    Smoking status: Never Smoker    Smokeless tobacco: Never Used    Tobacco comment: Stay smoke free.   Substance Use Topics    Alcohol use: No    Drug use: No     Review of Systems   Musculoskeletal: Positive for arthralgias.   Neurological: Negative for tingling and numbness.   All other systems reviewed and are negative.      Physical Exam     Initial Vitals  [05/30/19 2301]   BP Pulse Resp Temp SpO2   (!) 192/93 88 18 97.7 °F (36.5 °C) 98 %      MAP       --         Physical Exam    Nursing note and vitals reviewed.  Constitutional: She appears well-developed and well-nourished.   HENT:   Head: Normocephalic and atraumatic.   Eyes: Conjunctivae and EOM are normal.   Neck: Normal range of motion. Neck supple.   Cardiovascular: Normal rate, regular rhythm and normal heart sounds.   Pulmonary/Chest: Breath sounds normal. She has no wheezes. She has no rhonchi. She has no rales.   Abdominal: Soft. There is no tenderness. There is no rebound and no guarding.   Musculoskeletal: Normal range of motion.        Feet:    Neurological: She is alert. GCS score is 15. GCS eye subscore is 4. GCS verbal subscore is 5. GCS motor subscore is 6.   Skin: Skin is warm and dry. Capillary refill takes less than 2 seconds.   Psychiatric: She has a normal mood and affect. Her behavior is normal. Judgment and thought content normal.         ED Course   Procedures  Labs Reviewed - No data to display       Imaging Results    None                               Clinical Impression:       ICD-10-CM ICD-9-CM   1. Pain in toe of right foot M79.674 729.5         Disposition:   Disposition: Discharged  Condition: Stable                        Mary Alfaro MD  05/31/19 0017

## 2019-06-27 ENCOUNTER — TELEPHONE (OUTPATIENT)
Dept: CARDIOLOGY | Facility: CLINIC | Age: 59
End: 2019-06-27

## 2019-06-27 NOTE — TELEPHONE ENCOUNTER
Returned pt phone call    Left message advising pt to head over to the ER for ongoing chest pain     Requested call back if any questions

## 2019-06-27 NOTE — TELEPHONE ENCOUNTER
----- Message from Teresa Patiño sent at 6/27/2019  3:12 PM CDT -----  No. [phone]    Patient returned your call.

## 2019-06-27 NOTE — TELEPHONE ENCOUNTER
----- Message from Teresa Patiño sent at 6/27/2019 12:25 PM CDT -----  New patient no. [phone]   Patient's primary doctor told her to make an appointment with a cardiologist because of chest pain.   Patient was told that it would be the recommendation of the doctor to go to the ER.   She said she would like to speak to the nurse.

## 2019-07-15 DIAGNOSIS — M47.26 OTHER SPONDYLOSIS WITH RADICULOPATHY, LUMBAR REGION: ICD-10-CM

## 2019-07-15 DIAGNOSIS — M17.9 OSTEOARTHROSIS OF KNEE: ICD-10-CM

## 2019-07-15 DIAGNOSIS — M47.896 OTHER OSTEOARTHRITIS OF SPINE, LUMBAR REGION: Primary | ICD-10-CM

## 2019-08-02 ENCOUNTER — TELEPHONE (OUTPATIENT)
Dept: ORTHOPEDICS | Facility: CLINIC | Age: 59
End: 2019-08-02

## 2019-08-02 NOTE — TELEPHONE ENCOUNTER
----- Message from Janessa Palafox sent at 8/2/2019  9:59 AM CDT -----  Contact: self / [phone]  Patient is requesting a call back regarding, an appointment as well as she needs pain medication. Please advise

## 2019-08-05 NOTE — TELEPHONE ENCOUNTER
The patient cannot have any prescription medication from me for her condition.  She will need to see her primary care or use over-the-counter medication.

## 2019-08-22 ENCOUNTER — TELEPHONE (OUTPATIENT)
Dept: ORTHOPEDICS | Facility: CLINIC | Age: 59
End: 2019-08-22

## 2019-08-22 NOTE — TELEPHONE ENCOUNTER
"Spoke with patient.  Informed prescription for tramadol cannot be refilled per Dr Cruz.  Patient informed me "yes indeed-bye" and hung up phone.  "

## 2019-08-22 NOTE — TELEPHONE ENCOUNTER
----- Message from Margarita Harden sent at 8/22/2019  1:57 PM CDT -----  Contact: [phone]/self  Type:  RX Refill Request    Who Called:  self  Refill or New Rx: refill  RX Name and Strength: traMADol (ULTRAM) 50 mg tablet  How is the patient currently taking it? (ex. 1XDay): Take 1 tablet (50 mg total) by mouth every 12 (twelve) hours as needed for Pain. - Oral  Is this a 30 day or 90 day RX: 30  Preferred Pharmacy with phone number: [facility], [place] - [address]  Local or Mail Order: local  Ordering Provider:   Would the patient rather a call back or a response via [facility]?    Best Call Back Number:   Additional Information:

## 2019-08-23 ENCOUNTER — TELEPHONE (OUTPATIENT)
Dept: ORTHOPEDICS | Facility: CLINIC | Age: 59
End: 2019-08-23

## 2019-08-23 NOTE — TELEPHONE ENCOUNTER
----- Message from Viola Gordon sent at 8/22/2019 11:15 AM CDT -----  Contact: Self [phone]  Patient is calling to get refills on her medication sent to [address] [phone] (Phone) [phone] (Fax)    1. traMADol (ULTRAM) 50 mg tablet 60 tablet

## 2019-09-04 ENCOUNTER — HOSPITAL ENCOUNTER (EMERGENCY)
Facility: HOSPITAL | Age: 59
Discharge: HOME | End: 2019-09-05
Attending: EMERGENCY MEDICINE
Payer: MEDICAID

## 2019-09-04 DIAGNOSIS — R73.9 HYPERGLYCEMIA: ICD-10-CM

## 2019-09-04 DIAGNOSIS — R52 PAIN: ICD-10-CM

## 2019-09-04 DIAGNOSIS — M75.52 BURSITIS OF LEFT SHOULDER: Primary | ICD-10-CM

## 2019-09-04 DIAGNOSIS — M25.519 SHOULDER PAIN: ICD-10-CM

## 2019-09-04 LAB
ALBUMIN SERPL BCP-MCNC: 4.3 G/DL (ref 3.5–5.2)
ALP SERPL-CCNC: 134 U/L (ref 38–126)
ALT SERPL W/O P-5'-P-CCNC: 23 U/L (ref 10–44)
ANION GAP SERPL CALC-SCNC: 13 MMOL/L (ref 8–16)
AST SERPL-CCNC: 31 U/L (ref 15–46)
BASOPHILS # BLD AUTO: 0.02 K/UL (ref 0–0.2)
BASOPHILS NFR BLD: 0.2 % (ref 0–1.9)
BILIRUB SERPL-MCNC: 0.4 MG/DL (ref 0.1–1)
BUN SERPL-MCNC: 14 MG/DL (ref 7–17)
CALCIUM SERPL-MCNC: 10.3 MG/DL (ref 8.7–10.5)
CHLORIDE SERPL-SCNC: 95 MMOL/L (ref 95–110)
CO2 SERPL-SCNC: 27 MMOL/L (ref 23–29)
CREAT SERPL-MCNC: 0.68 MG/DL (ref 0.5–1.4)
DIFFERENTIAL METHOD: NORMAL
EOSINOPHIL # BLD AUTO: 0.2 K/UL (ref 0–0.5)
EOSINOPHIL NFR BLD: 1.9 % (ref 0–8)
ERYTHROCYTE [DISTWIDTH] IN BLOOD BY AUTOMATED COUNT: 12.4 % (ref 11.5–14.5)
EST. GFR  (AFRICAN AMERICAN): >60 ML/MIN/1.73 M^2
EST. GFR  (NON AFRICAN AMERICAN): >60 ML/MIN/1.73 M^2
GLUCOSE SERPL-MCNC: 414 MG/DL (ref 70–110)
HCT VFR BLD AUTO: 40.2 % (ref 37–48.5)
HGB BLD-MCNC: 13.1 G/DL (ref 12–16)
LYMPHOCYTES # BLD AUTO: 3.2 K/UL (ref 1–4.8)
LYMPHOCYTES NFR BLD: 37.2 % (ref 18–48)
MCH RBC QN AUTO: 28.2 PG (ref 27–31)
MCHC RBC AUTO-ENTMCNC: 32.6 G/DL (ref 32–36)
MCV RBC AUTO: 87 FL (ref 82–98)
MONOCYTES # BLD AUTO: 0.4 K/UL (ref 0.3–1)
MONOCYTES NFR BLD: 5.2 % (ref 4–15)
NEUTROPHILS # BLD AUTO: 4.7 K/UL (ref 1.8–7.7)
NEUTROPHILS NFR BLD: 55.5 % (ref 38–73)
NT-PROBNP: 20 PG/ML (ref 5–900)
PLATELET # BLD AUTO: 214 K/UL (ref 150–350)
PMV BLD AUTO: 11.3 FL (ref 9.2–12.9)
POTASSIUM SERPL-SCNC: 3.8 MMOL/L (ref 3.5–5.1)
PROT SERPL-MCNC: 7.7 G/DL (ref 6–8.4)
RBC # BLD AUTO: 4.64 M/UL (ref 4–5.4)
SODIUM SERPL-SCNC: 135 MMOL/L (ref 136–145)
TROPONIN I SERPL DL<=0.01 NG/ML-MCNC: <0.012 NG/ML (ref 0.01–0.03)
WBC # BLD AUTO: 8.52 K/UL (ref 3.9–12.7)

## 2019-09-04 PROCEDURE — 96372 THER/PROPH/DIAG INJ SC/IM: CPT | Mod: ER

## 2019-09-04 PROCEDURE — 99285 EMERGENCY DEPT VISIT HI MDM: CPT | Mod: 25,ER

## 2019-09-04 PROCEDURE — 82962 GLUCOSE BLOOD TEST: CPT | Mod: ER

## 2019-09-04 PROCEDURE — 93010 ELECTROCARDIOGRAM REPORT: CPT | Mod: ,,, | Performed by: INTERNAL MEDICINE

## 2019-09-04 PROCEDURE — 84484 ASSAY OF TROPONIN QUANT: CPT | Mod: ER

## 2019-09-04 PROCEDURE — 80053 COMPREHEN METABOLIC PANEL: CPT | Mod: ER

## 2019-09-04 PROCEDURE — 93005 ELECTROCARDIOGRAM TRACING: CPT | Mod: ER

## 2019-09-04 PROCEDURE — 85025 COMPLETE CBC W/AUTO DIFF WBC: CPT | Mod: ER

## 2019-09-04 PROCEDURE — 83880 ASSAY OF NATRIURETIC PEPTIDE: CPT | Mod: ER

## 2019-09-04 PROCEDURE — 93010 EKG 12-LEAD: ICD-10-PCS | Mod: ,,, | Performed by: INTERNAL MEDICINE

## 2019-09-05 VITALS
OXYGEN SATURATION: 96 % | SYSTOLIC BLOOD PRESSURE: 134 MMHG | WEIGHT: 271 LBS | RESPIRATION RATE: 18 BRPM | HEART RATE: 111 BPM | TEMPERATURE: 98 F | HEIGHT: 66 IN | DIASTOLIC BLOOD PRESSURE: 80 MMHG | BODY MASS INDEX: 43.55 KG/M2

## 2019-09-05 LAB — POCT GLUCOSE: 353 MG/DL (ref 70–110)

## 2019-09-05 PROCEDURE — 63600175 PHARM REV CODE 636 W HCPCS: Mod: ER | Performed by: EMERGENCY MEDICINE

## 2019-09-05 RX ORDER — KETOROLAC TROMETHAMINE 30 MG/ML
60 INJECTION, SOLUTION INTRAMUSCULAR; INTRAVENOUS
Status: COMPLETED | OUTPATIENT
Start: 2019-09-05 | End: 2019-09-05

## 2019-09-05 RX ORDER — KETOROLAC TROMETHAMINE 10 MG/1
10 TABLET, FILM COATED ORAL EVERY 8 HOURS
Qty: 15 TABLET | Refills: 0 | Status: SHIPPED | OUTPATIENT
Start: 2019-09-05 | End: 2019-11-09

## 2019-09-05 RX ADMIN — INSULIN HUMAN 10 UNITS: 100 INJECTION, SOLUTION PARENTERAL at 12:09

## 2019-09-05 RX ADMIN — KETOROLAC TROMETHAMINE 60 MG: 30 INJECTION, SOLUTION INTRAMUSCULAR at 12:09

## 2019-09-05 NOTE — ED PROVIDER NOTES
Encounter Date: 9/4/2019       History     Chief Complaint   Patient presents with    Shoulder Pain     c/o left shoulder pain started 3 nights ago. states pain is in arm as well. states has some tighteness in her chest as well. tighteness is intermittent. hx chf. denies any injury.      The history is provided by the patient.   Arm Injury    The incident occurred several days ago. The incident occurred at home. The injury mechanism is unknown. The context of the injury is unknown. The wounds were not self-inflicted. She came to the ER via by private vehicle. There is an injury to the left shoulder. Associated symptoms include neck pain. Pertinent negatives include no chest pain, no numbness, no abdominal pain, no bowel incontinence, no nausea, no vomiting, no headaches, no inability to bear weight, no pain when bearing weight, no cough and no difficulty breathing.     Review of patient's allergies indicates:   Allergen Reactions    Lantus [insulin glargine] Swelling     Face swelling and itching.    Penicillins Rash     Past Medical History:   Diagnosis Date    Arthritis     Asthma     CHF (congestive heart failure)     Diabetes mellitus     GERD (gastroesophageal reflux disease)     Hypertension     Neuropathy      Past Surgical History:   Procedure Laterality Date    ARTHROSCOPY, KNEE, WITH CHONDROPLASTY  8/13/2018    [facility]    ARTHROSCOPY, KNEE, WITH MENISCECTOMY Right 8/13/2018    [facility] at [facility]    HAND SURGERY Left     TUBAL LIGATION       Family History   Problem Relation Age of Onset    Cancer Mother     Cancer Father      Social History     Tobacco Use    Smoking status: Never Smoker    Smokeless tobacco: Never Used    Tobacco comment: Stay smoke free.   Substance Use Topics    Alcohol use: No    Drug use: No     Review of Systems   Respiratory: Negative for cough.    Cardiovascular: Negative for chest pain.    Gastrointestinal: Negative for abdominal pain, bowel incontinence, nausea and vomiting.   Musculoskeletal: Positive for neck pain.   Neurological: Negative for numbness and headaches.   All other systems reviewed and are negative.      Physical Exam     Initial Vitals [09/04/19 2158]   BP Pulse Resp Temp SpO2   (!) 144/91 (!) 111 18 97.7 °F (36.5 °C) 96 %      MAP       --         Physical Exam    Nursing note and vitals reviewed.  Constitutional: She appears well-developed and well-nourished.   HENT:   Head: Normocephalic and atraumatic.   Eyes: Conjunctivae and EOM are normal.   Neck: Normal range of motion. Neck supple.   Cardiovascular: Normal rate, regular rhythm and normal heart sounds.   Pulmonary/Chest: Breath sounds normal. No respiratory distress. She has no wheezes. She has no rhonchi. She has no rales.   Abdominal: Soft. There is no tenderness. There is no rebound and no guarding.   Musculoskeletal:        Left shoulder: She exhibits decreased range of motion, tenderness, bony tenderness and pain. She exhibits no swelling, no effusion and no crepitus.   Neurological: She is alert and oriented to person, place, and time. GCS score is 15. GCS eye subscore is 4. GCS verbal subscore is 5. GCS motor subscore is 6.   Skin: Skin is warm and dry. Capillary refill takes less than 2 seconds.   Psychiatric: She has a normal mood and affect. Her behavior is normal. Judgment and thought content normal.         ED Course   Procedures  Labs Reviewed   COMPREHENSIVE METABOLIC PANEL - Abnormal; Notable for the following components:       Result Value    Sodium 135 (*)     Glucose 414 (*)     Alkaline Phosphatase 134 (*)     All other components within normal limits   CBC W/ AUTO DIFFERENTIAL   TROPONIN I   NT-PRO NATRIURETIC PEPTIDE          Imaging Results          X-Ray Shoulder Trauma Left (Final result)  Result time 09/04/19 23:43:41    Final result by Raul Cervantes MD (09/04/19 23:43:41)                  Impression:      Normal left shoulder.      Electronically signed by: Raul Cervantes MD  Date:    09/04/2019  Time:    23:43             Narrative:    EXAMINATION:  XR SHOULDER TRAUMA 3 VIEW LEFT    CLINICAL HISTORY:  Pain, unspecified    TECHNIQUE:  Three views of the left shoulder were performed.    COMPARISON  None    FINDINGS:  The glenohumeral joint appears normal.  No evidence of fracture or dislocation.                                 Medical Decision Making:   Clinical Tests:   Lab Tests: Ordered and Reviewed  Radiological Study: Ordered and Reviewed  Medical Tests: Ordered and Reviewed                      Clinical Impression:       ICD-10-CM ICD-9-CM   1. Bursitis of left shoulder M75.52 726.10   2. Shoulder pain M25.519 719.41   3. Pain R52 780.96   4. Hyperglycemia R73.9 790.29         Disposition:   Disposition: Discharged  Condition: Stable                        Mary Alfaro MD  09/05/19 0204

## 2019-09-27 DIAGNOSIS — Z12.39 ENCOUNTER FOR SPECIAL SCREENING EXAMINATION FOR NEOPLASM OF BREAST: Primary | ICD-10-CM

## 2019-10-02 ENCOUNTER — HOSPITAL ENCOUNTER (OUTPATIENT)
Dept: RADIOLOGY | Facility: HOSPITAL | Age: 59
Discharge: HOME | End: 2019-10-02
Attending: NURSE PRACTITIONER
Payer: MEDICAID

## 2019-10-02 DIAGNOSIS — Z12.39 ENCOUNTER FOR SPECIAL SCREENING EXAMINATION FOR NEOPLASM OF BREAST: ICD-10-CM

## 2019-10-02 PROCEDURE — 77067 SCR MAMMO BI INCL CAD: CPT | Mod: TC,PO

## 2019-10-15 ENCOUNTER — TELEPHONE (OUTPATIENT)
Dept: ORTHOPEDICS | Facility: CLINIC | Age: 59
End: 2019-10-15

## 2019-10-15 ENCOUNTER — OFFICE VISIT (OUTPATIENT)
Dept: ORTHOPEDICS | Facility: CLINIC | Age: 59
End: 2019-10-15
Payer: MEDICAID

## 2019-10-15 VITALS — WEIGHT: 270 LBS | BODY MASS INDEX: 43.39 KG/M2 | HEIGHT: 66 IN

## 2019-10-15 DIAGNOSIS — M17.11 PRIMARY OSTEOARTHRITIS OF RIGHT KNEE: Primary | ICD-10-CM

## 2019-10-15 DIAGNOSIS — G89.29 CHRONIC PAIN OF RIGHT KNEE: ICD-10-CM

## 2019-10-15 DIAGNOSIS — M25.561 CHRONIC PAIN OF RIGHT KNEE: ICD-10-CM

## 2019-10-15 PROCEDURE — 99999 PR PBB SHADOW E&M-EST. PATIENT-LVL II: ICD-10-PCS | Mod: PBBFAC,,, | Performed by: ORTHOPAEDIC SURGERY

## 2019-10-15 PROCEDURE — 99213 OFFICE O/P EST LOW 20 MIN: CPT | Mod: S$PBB,,, | Performed by: ORTHOPAEDIC SURGERY

## 2019-10-15 PROCEDURE — 99212 OFFICE O/P EST SF 10 MIN: CPT | Mod: PBBFAC,PN | Performed by: ORTHOPAEDIC SURGERY

## 2019-10-15 PROCEDURE — 99999 PR PBB SHADOW E&M-EST. PATIENT-LVL II: CPT | Mod: PBBFAC,,, | Performed by: ORTHOPAEDIC SURGERY

## 2019-10-15 PROCEDURE — 99213 PR OFFICE/OUTPT VISIT, EST, LEVL III, 20-29 MIN: ICD-10-PCS | Mod: S$PBB,,, | Performed by: ORTHOPAEDIC SURGERY

## 2019-10-15 RX ORDER — GABAPENTIN 600 MG/1
600 TABLET ORAL 3 TIMES DAILY
Qty: 90 TABLET | Refills: 3 | Status: SHIPPED | OUTPATIENT
Start: 2019-10-15 | End: 2019-12-17

## 2019-10-15 NOTE — PROGRESS NOTES
Subjective:      Patient ID: Mame Kumari is a 58 y.o. female.    Chief Complaint: Pain of the Left Knee; Follow-up of the Right Knee; and Pain of the Left Shoulder    HPI  This is a follow-up for right knee pain. The patient reports she has persistent medial pain at limits ambulation.  She is reportedly lost 100 lb in order to improve her overall health status and finds this somewhat beneficial.  She denies any true mechanical symptoms. Current medications have not been helpful.  Review of Systems   Constitution: Negative for fever and weight loss.   HENT: Negative for congestion.    Eyes: Negative for visual disturbance.   Cardiovascular: Negative for chest pain.   Respiratory: Negative for shortness of breath.    Hematologic/Lymphatic: Negative for bleeding problem. Does not bruise/bleed easily.   Skin: Negative for poor wound healing.   Musculoskeletal: Positive for joint pain.   Gastrointestinal: Negative for abdominal pain.   Genitourinary: Negative for dysuria.   Neurological: Negative for seizures.   Psychiatric/Behavioral: Negative for altered mental status.   Allergic/Immunologic: Negative for persistent infections.         Objective:      Ortho/SPM Exam      Right knee      [unfilled]    The patient is not in acute distress.   Sclerae normal  Body habitus is overweight.  Respiratory distress:  none   The patient walks with a limp.  Hip irritability  negative.   The skin over the knee is intact.  Knee effusion 0  Tendernes is located medially to a mild degree  Range of motion- Flexion 120 deg, Extension 0 deg,   Ligament laxity exam:   MCL trace   Lachman 0   Post sag  0    LCL 0  Patellar apprehension negative.  Popliteal cyst negative  Patellar crepitation absent.  Flexion/pinch negative  Pulses DP present, PT present.  Motor normal 5/5 strength in all tested muscle groups.   Sensory normal.      I reviewed the relevant radiographic images for the patient's condition:  Today's right knee films show  mild medial narrowing        Assessment:       Encounter Diagnoses   Name Primary?    Primary osteoarthritis of right knee Yes    Chronic pain of right knee         The patient's level of discomfort and her gait impairment are more than I would expect based on her other findings.  Given her generalized arthralgias, she may have hypersensitivity to musculoskeletal pain.          Plan:       Mame was seen today for pain, follow-up and pain.    Diagnoses and all orders for this visit:    Primary osteoarthritis of right knee    Chronic pain of right knee          I explained my diagnostic impression and the reasoning behind it in detail, using layman's terms.  Models and/or pictures were used to help in the explanation.    Considering the above I do not recommend surgery at this time.    Although I it considered adding another centrally active agent, given the patient's medication list it is more reasonable to adjust the current medication to an dosage more likely to be of benefit.  Will increase gabapentin to 3 times daily.    I gave her the name of our hand surgeon to address her upper extremity complaints.

## 2019-10-15 NOTE — TELEPHONE ENCOUNTER
----- Message from Duarte Almonte sent at 10/15/2019  1:18 PM CDT -----  Contact: patient  Patient called to advise she cannot take the medication below as she has congestive heart failure so she needs something else.    Please call [phone] to discuss today.    gabapentin (NEURONTIN) 600 MG tablet

## 2019-10-16 ENCOUNTER — TELEPHONE (OUTPATIENT)
Dept: ORTHOPEDICS | Facility: CLINIC | Age: 59
End: 2019-10-16

## 2019-10-16 NOTE — TELEPHONE ENCOUNTER
----- Message from Teresa Patiño sent at 10/16/2019  1:36 PM CDT -----  Patient called yesterday.   No. [phone]   Patient is not able to take Gabapentin.   Please call in Tramadol.    [facility] in [place]

## 2019-10-16 NOTE — TELEPHONE ENCOUNTER
Returned call to pt let her know that Dr Curz nor Lizzie were going to give her Ultram because that is not the correct meds to treat arthritis. She said that her PCP gave it to her and it worked so that's what she needs. I told her that she can call her PCP to see if will give it to her cause they will not. She said the PCP won't give it to her anymore and that the Drs should treat her for her pain that is what they are supposed to do. I told her Dr Cruz put in a referral for rheumatology so I can set that appt up for her. She said she doesn't have time for that and she hung up the phone.

## 2019-10-18 ENCOUNTER — HOSPITAL ENCOUNTER (EMERGENCY)
Facility: HOSPITAL | Age: 59
Discharge: HOME | End: 2019-10-18
Attending: EMERGENCY MEDICINE
Payer: MEDICAID

## 2019-10-18 VITALS
HEIGHT: 66 IN | SYSTOLIC BLOOD PRESSURE: 181 MMHG | DIASTOLIC BLOOD PRESSURE: 93 MMHG | HEART RATE: 100 BPM | BODY MASS INDEX: 41.78 KG/M2 | RESPIRATION RATE: 20 BRPM | TEMPERATURE: 98 F | WEIGHT: 260 LBS | OXYGEN SATURATION: 99 %
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
## 2019-10-18 PROCEDURE — 99284 EMERGENCY DEPT VISIT MOD MDM: CPT | Mod: ER

## 2019-10-18 RX ORDER — MUPIROCIN 20 MG/G
OINTMENT TOPICAL 3 TIMES DAILY
Qty: 22 G | Refills: 0 | Status: SHIPPED | OUTPATIENT
Start: 2019-10-18 | End: 2022-09-07

## 2019-10-18 RX ORDER — SULFAMETHOXAZOLE AND TRIMETHOPRIM 800; 160 MG/1; MG/1
1 TABLET ORAL 2 TIMES DAILY
Qty: 14 TABLET | Refills: 0 | Status: SHIPPED | OUTPATIENT
Start: 2019-10-18 | End: 2019-10-25

## 2019-10-19 NOTE — ED PROVIDER NOTES
Encounter Date: 10/18/2019       History     Chief Complaint   Patient presents with    Insect Bite     Reports to ED c c/o insect bite x 4 days ago. Pt reports she has been scratching and picking at site. States she has been putting neosporyn on site. No redness or swelling noted. Deneis fevers.      Reports to ED c c/o insect bite x 4 days ago under her chin. Pt reports she has been scratching and picking at site. States she has been putting neosporyn on site  with minimal improvement.  Denies fever.         Review of patient's allergies indicates:   Allergen Reactions    Lantus [insulin glargine] Swelling     Face swelling and itching.    Penicillins Rash     Past Medical History:   Diagnosis Date    Arthritis     Asthma     CHF (congestive heart failure)     Diabetes mellitus     GERD (gastroesophageal reflux disease)     Hypertension     Neuropathy      Past Surgical History:   Procedure Laterality Date    ARTHROSCOPIC CHONDROPLASTY OF KNEE JOINT  8/13/2018    Procedure: ARTHROSCOPY, KNEE, WITH CHONDROPLASTY;  Surgeon: Jairo Cruz MD;  Location: [facility] OR;  Service: Orthopedics;;    HAND SURGERY Left     KNEE ARTHROSCOPY W/ MENISCECTOMY Right 8/13/2018    Procedure: ARTHROSCOPY, KNEE, WITH MENISCECTOMY;  Surgeon: Jairo Cruz MD;  Location: [facility] OR;  Service: Orthopedics;  Laterality: Right;    TUBAL LIGATION       Family History   Problem Relation Age of Onset    Cancer Mother     Cancer Father     Breast cancer Maternal Grandmother      Social History     Tobacco Use    Smoking status: Never Smoker    Smokeless tobacco: Never Used    Tobacco comment: Stay smoke free.   Substance Use Topics    Alcohol use: No    Drug use: No     Review of Systems   Constitutional: Negative for diaphoresis, fatigue and fever.        14 Point ROS completed and negative with the exception of HPI and Below.   HENT: Negative for congestion, ear pain and sore throat.    Eyes: Negative for discharge and  itching.   Respiratory: Negative for cough, chest tightness, shortness of breath and wheezing.    Cardiovascular: Negative for chest pain, palpitations and leg swelling.   Gastrointestinal: Negative for abdominal distention, abdominal pain, nausea and vomiting.   Genitourinary: Negative for dysuria, flank pain and frequency.   Musculoskeletal: Negative for back pain, neck pain and neck stiffness.   Skin: Positive for color change. Negative for pallor, rash and wound.   Neurological: Negative for dizziness, syncope, facial asymmetry, weakness and headaches.   Hematological: Does not bruise/bleed easily.   Psychiatric/Behavioral: Negative for agitation, behavioral problems and confusion.   All other systems reviewed and are negative.      Physical Exam     Initial Vitals [10/18/19 2158]   BP Pulse Resp Temp SpO2   (!) 181/93 100 20 98.1 °F (36.7 °C) 99 %      MAP       --         Physical Exam    Constitutional: She appears well-developed and well-nourished.   HENT:   Head: Normocephalic and atraumatic.   Right Ear: External ear normal.   Left Ear: External ear normal.   Nose: Nose normal.   Mouth/Throat: Oropharynx is clear and moist.   Eyes: Conjunctivae and EOM are normal. Pupils are equal, round, and reactive to light. Right eye exhibits no discharge. Left eye exhibits no discharge. No scleral icterus.   Neck: Normal range of motion. Neck supple. No thyromegaly present. No tracheal deviation present. No JVD present.   Cardiovascular: Normal rate, regular rhythm, normal heart sounds and intact distal pulses. Exam reveals no gallop and no friction rub.    No murmur heard.  Pulmonary/Chest: Breath sounds normal. No stridor. No respiratory distress. She has no wheezes. She has no rhonchi. She has no rales. She exhibits no tenderness.   Abdominal: Soft. Bowel sounds are normal. She exhibits no distension and no mass. There is no tenderness. There is no rebound and no guarding.   Musculoskeletal: Normal range of motion.  She exhibits no edema or tenderness.   Lymphadenopathy:     She has no cervical adenopathy.   Neurological: She is alert and oriented to person, place, and time. She has normal strength and normal reflexes. She displays normal reflexes. No cranial nerve deficit or sensory deficit.   Skin: Skin is warm and dry. Capillary refill takes less than 2 seconds. No rash and no abscess noted. There is erythema. No pallor.   1-2 cm area underneath her chin slightly swollen with mild erythema and tenderness.  No fluctuance.  Does not appear to have encapsulated.   Psychiatric: She has a normal mood and affect. Her behavior is normal. Thought content normal.         ED Course   Procedures  Labs Reviewed - No data to display       Imaging Results    None          Medical Decision Making:   Initial Assessment:   Reports to ED c c/o insect bite x 4 days ago under her chin. Pt reports she has been scratching and picking at site. States she has been putting neosporyn on site  with minimal improvement.  Denies fever.     1-2 cm area underneath her chin slightly swollen with mild erythema and tenderness.  No fluctuance.  Does not appear to have encapsulated.  Differential Diagnosis:   Cellulitis, bug bite, allergic reaction.  ED Management:  Patient will be treated with oral antibiotics to cover for more serious etiology.  Patient verbalized understanding of all discharge instructions and the need to follow up with her primary care physician as needed.  Patient feels comfortable caring for this condition at home with the treatment plan outlined here in the ED.  Denies any questions.                      Clinical Impression:       ICD-10-CM ICD-9-CM   1. Bug bite with infection, initial encounter W57.XXXA 919.5     E906.4                                Wilmar Chaney PA-C  10/18/19 222     General

## 2019-11-09 ENCOUNTER — HOSPITAL ENCOUNTER (EMERGENCY)
Facility: HOSPITAL | Age: 59
Discharge: HOME | End: 2019-11-09
Attending: FAMILY MEDICINE
Payer: MEDICAID

## 2019-11-09 VITALS
SYSTOLIC BLOOD PRESSURE: 168 MMHG | HEIGHT: 66 IN | RESPIRATION RATE: 18 BRPM | WEIGHT: 260 LBS | BODY MASS INDEX: 41.78 KG/M2 | TEMPERATURE: 98 F | DIASTOLIC BLOOD PRESSURE: 85 MMHG | OXYGEN SATURATION: 98 % | HEART RATE: 94 BPM

## 2019-11-09 DIAGNOSIS — M25.512 PAIN IN JOINT OF LEFT SHOULDER: Primary | ICD-10-CM

## 2019-11-09 DIAGNOSIS — M25.519 SHOULDER PAIN: ICD-10-CM

## 2019-11-09 PROCEDURE — 99283 EMERGENCY DEPT VISIT LOW MDM: CPT | Mod: 25,ER

## 2019-11-09 RX ORDER — KETOROLAC TROMETHAMINE 10 MG/1
10 TABLET, FILM COATED ORAL
Status: DISCONTINUED | OUTPATIENT
Start: 2019-11-09 | End: 2019-11-09 | Stop reason: HOSPADM

## 2019-11-09 RX ORDER — DICLOFENAC SODIUM 50 MG/1
50 TABLET, DELAYED RELEASE ORAL 2 TIMES DAILY
Qty: 10 TABLET | Refills: 0 | OUTPATIENT
Start: 2019-11-09 | End: 2020-06-19

## 2019-11-09 NOTE — DISCHARGE INSTRUCTIONS
You are instructed to follow up with  your primary care provider for re-evaluation of your arthritis.  You are instructed to return to the emergency department immediately for any new or worsening symptoms.

## 2019-11-09 NOTE — ED PROVIDER NOTES
Encounter Date: 11/9/2019       History     Chief Complaint   Patient presents with    Left shoulder pain     x's 3 days      59-year-old female presents to the emergency department for evaluation of 3 day history of worsening chronic left shoulder pain. She reports that she has had pain intermittently in her left shoulder for approximately 12 years after falling on her shoulder.  She reports that she was diagnosed with bursitis and arthritis in the shoulder.  She states that she has been taking Advil at home with mild relief of symptoms. She reports her last dose of Advil was yesterday.  She reports that is a constant, achy, throbbing pain in her shoulder without radiation to arm or neck.  She denies any headache, dizziness, chest pain, palpitations, shortness of breath, sweating, abdominal pain, nausea or vomiting. She reports that her blood glucose level this morning was 200 and her primary care provider is working with her to keep her blood glucose at a normal level.        Review of patient's allergies indicates:   Allergen Reactions    Lantus [insulin glargine] Swelling     Face swelling and itching.    Penicillins Rash     Past Medical History:   Diagnosis Date    Arthritis     Asthma     CHF (congestive heart failure)     Diabetes mellitus     GERD (gastroesophageal reflux disease)     Hypertension     Neuropathy      Past Surgical History:   Procedure Laterality Date    ARTHROSCOPIC CHONDROPLASTY OF KNEE JOINT  8/13/2018    Procedure: ARTHROSCOPY, KNEE, WITH CHONDROPLASTY;  Surgeon: Jairo Cruz MD;  [facility];  Service: Orthopedics;;    HAND SURGERY Left     KNEE ARTHROSCOPY W/ MENISCECTOMY Right 8/13/2018    Procedure: ARTHROSCOPY, KNEE, WITH MENISCECTOMY;  Surgeon: Jairo Cruz MD;  [facility];  Service: Orthopedics;  Laterality: Right;    TUBAL LIGATION       Family History   Problem Relation Age of Onset    Cancer Mother     Cancer Father     Breast cancer  Maternal Grandmother      Social History     Tobacco Use    Smoking status: Never Smoker    Smokeless tobacco: Never Used    Tobacco comment: Stay smoke free.   Substance Use Topics    Alcohol use: No    Drug use: No     Review of Systems   Constitutional: Negative for activity change, appetite change and fever.   HENT: Negative for congestion, ear discharge, rhinorrhea, sore throat, trouble swallowing and voice change.    Eyes: Negative for visual disturbance.   Respiratory: Negative for cough and shortness of breath.    Cardiovascular: Negative for chest pain.   Gastrointestinal: Negative for abdominal pain, constipation, diarrhea, nausea and vomiting.   Genitourinary: Negative for decreased urine volume and dysuria.   Musculoskeletal: Positive for arthralgias. Negative for back pain, joint swelling, neck pain and neck stiffness.   Skin: Negative for rash.   Neurological: Negative for dizziness, syncope, weakness, light-headedness, numbness and headaches.       Physical Exam     Initial Vitals [11/09/19 1002]   BP Pulse Resp Temp SpO2   (!) 168/85 94 18 97.5 °F (36.4 °C) 98 %      MAP       --         Physical Exam    Nursing note and vitals reviewed.  Constitutional: She appears well-developed and well-nourished. She is not diaphoretic. No distress.   HENT:   Head: Normocephalic and atraumatic.   Right Ear: External ear normal.   Left Ear: External ear normal.   Nose: Nose normal.   Mouth/Throat: Oropharynx is clear and moist.   Eyes: Pupils are equal, round, and reactive to light.   Neck: Normal range of motion. Neck supple.   Cardiovascular: Normal rate, regular rhythm and normal heart sounds.   Pulmonary/Chest: Breath sounds normal. No respiratory distress. She has no wheezes. She has no rhonchi. She has no rales. She exhibits no tenderness.   Musculoskeletal:        Left shoulder: She exhibits tenderness and bony tenderness. She exhibits normal range of motion, no swelling, no effusion and no deformity.         Cervical back: She exhibits normal range of motion, no tenderness and no bony tenderness.        Thoracic back: She exhibits normal range of motion, no tenderness and no bony tenderness.        Lumbar back: She exhibits normal range of motion, no tenderness and no bony tenderness.   Neurological: She is alert and oriented to person, place, and time.   Skin: Skin is warm and dry.   Psychiatric: She has a normal mood and affect.         ED Course   Procedures  Labs Reviewed - No data to display       Imaging Results          X-Ray Shoulder 2 or More Views Left (Final result)  Result time 11/09/19 10:36:27   Procedure changed from X-Ray Shoulder Trauma Left     Final result by Daquan Weber MD (11/09/19 10:36:27)                 Impression:      No acute findings.  Chronic arthritic change of the shoulder is present.      Electronically signed by: Daquan Weber  Date:    11/09/2019  Time:    10:36             Narrative:    EXAMINATION:  XR SHOULDER COMPLETE 2 OR MORE VIEWS LEFT    CLINICAL HISTORY:  Pain in unspecified shoulderpain;    TECHNIQUE:  Standard radiography performed.    COMPARISON:  09/04/2019    FINDINGS:  No fracture or dislocation is demonstrated.  Some mild chronic arthritic change of the glenohumeral joint and acromioclavicular joint is present.  No acute findings.                                 Medical Decision Making:   Initial Assessment:   59-year-old female presents for evaluation of acute exacerbation of chronic left shoulder pain and arthritis.  Physical exam reveals a nontoxic-appearing female in no acute distress. Patient is afebrile vital signs within normal limits.  Neurological exam reveals an alert and oriented patient.  Examination of the left upper extremity reveals tenderness to palpation noted over the left shoulder.  No erythema, edema or warmth noted.  Full range of motion, sensation and peripheral pulses intact in upper extremities bilaterally.  Differential Diagnosis:    Arthralgia  Bursitis  X-ray ordered to assess possible osseous injury including fracture or dislocation  I carefully considered but doubt serious etiology including ACS.  ED Management:  X-ray reveals chronic arthritic changes to the shoulder.  Discussed these findings at length patient verbalizes understanding and agreement course of treatment.  Instructed the patient to follow up with her primary care provider for re-evaluation and to return to the emergency department immediately for any new or worsening symptoms.                                 Clinical Impression:       ICD-10-CM ICD-9-CM   1. Pain in joint of left shoulder M25.512 719.41   2. Shoulder pain M25.519 719.41                             Niru Carter PA-C  11/09/19 1053

## 2019-12-14 ENCOUNTER — HOSPITAL ENCOUNTER (EMERGENCY)
Facility: HOSPITAL | Age: 59
Discharge: HOME | End: 2019-12-14
Attending: EMERGENCY MEDICINE
Payer: MEDICAID

## 2019-12-14 VITALS
SYSTOLIC BLOOD PRESSURE: 143 MMHG | RESPIRATION RATE: 20 BRPM | HEIGHT: 66 IN | DIASTOLIC BLOOD PRESSURE: 81 MMHG | WEIGHT: 261 LBS | OXYGEN SATURATION: 97 % | TEMPERATURE: 98 F | BODY MASS INDEX: 41.95 KG/M2 | HEART RATE: 93 BPM

## 2019-12-14 DIAGNOSIS — M19.012 PRIMARY OSTEOARTHRITIS OF LEFT SHOULDER: ICD-10-CM

## 2019-12-14 DIAGNOSIS — M25.512 CHRONIC LEFT SHOULDER PAIN: Primary | ICD-10-CM

## 2019-12-14 DIAGNOSIS — G89.29 CHRONIC LEFT SHOULDER PAIN: Primary | ICD-10-CM

## 2019-12-14 PROCEDURE — 99284 EMERGENCY DEPT VISIT MOD MDM: CPT | Mod: 25

## 2019-12-14 PROCEDURE — 63600175 PHARM REV CODE 636 W HCPCS: Performed by: EMERGENCY MEDICINE

## 2019-12-14 PROCEDURE — 96372 THER/PROPH/DIAG INJ SC/IM: CPT

## 2019-12-14 RX ORDER — IBUPROFEN 600 MG/1
600 TABLET ORAL EVERY 6 HOURS PRN
Qty: 20 TABLET | Refills: 0 | Status: SHIPPED | OUTPATIENT
Start: 2019-12-14

## 2019-12-14 RX ORDER — METHOCARBAMOL 750 MG/1
1500 TABLET, FILM COATED ORAL 3 TIMES DAILY
Qty: 30 TABLET | Refills: 0 | Status: SHIPPED | OUTPATIENT
Start: 2019-12-14 | End: 2019-12-19

## 2019-12-14 RX ORDER — KETOROLAC TROMETHAMINE 30 MG/ML
60 INJECTION, SOLUTION INTRAMUSCULAR; INTRAVENOUS
Status: COMPLETED | OUTPATIENT
Start: 2019-12-14 | End: 2019-12-14

## 2019-12-14 RX ADMIN — KETOROLAC TROMETHAMINE 60 MG: 30 INJECTION, SOLUTION INTRAMUSCULAR at 06:12

## 2019-12-14 NOTE — ED TRIAGE NOTES
Patient presents to the ED with reports of having left arm pain and left shoulder pain that started approximately x 2 weeks ago. Denies any trauma or fall. Treated with tylenol with minimal relief.     Review of patient's allergies indicates:   Allergen Reactions    Lantus [insulin glargine] Swelling     Face swelling and itching.    Penicillins Rash        Patient has verified the spelling of their name and  on armband.   APPEARANCE: Patient is alert, calm, oriented x 4, and does not appear distressed.  SKIN: Skin is normal for race, warm, and dry. Normal skin turgor and mucous membranes moist.  CARDIAC: Normal rate and no murmur heard.   RESPIRATORY:Normal rate and effort. Breath sounds clear bilaterally throughout chest. Respirations are equal and unlabored.    MUSCLE: Limited ROM to the left shoulder. Left shoulder pain. No obvious deformity.  PERIPHERAL VASCULAR: peripheral pulses present. Normal cap refill. No edema. Warm to touch.  NEURO: 5/5 strength major flexors/extensors bilaterally. Sensory intact to light touch bilaterally. GCS 15. No neurological abnormalities.   ENT: Nasal congestion noted.

## 2019-12-14 NOTE — ED PROVIDER NOTES
Encounter Date: 12/14/2019    SCRIBE #1 NOTE: I, Karla Borrego, am scribing for, and in the presence of,  Dr. Duenas. I have scribed the entire note.       History     Chief Complaint   Patient presents with    Arm Pain     Complaining of left arm pain x 2 weeks.  Denies any trauma.     Time seen by provider: 6:20 AM    This is a 59 y.o. female with a history of HTN, DM, GERD, neuropathy, Arthritis and CHF who presents with complaint of chronic left arm pain that began two weeks ago. Patient reports pain has been worsening the last couple of days which prompted ED visit. Pain is described as constant aching that radiates down arm. Pain is worsened with movement with no alleviating factors. Patient denies any fever, cough, congestion, abdominal pain, nausea or vomiting. Patient has been seen multiple times for pain to shoulder.       The history is provided by the patient.     Review of patient's allergies indicates:   Allergen Reactions    Lantus [insulin glargine] Swelling     Face swelling and itching.    Penicillins Rash     Past Medical History:   Diagnosis Date    Arthritis     Asthma     CHF (congestive heart failure)     Diabetes mellitus     GERD (gastroesophageal reflux disease)     Hypertension     Neuropathy      Past Surgical History:   Procedure Laterality Date    ARTHROSCOPIC CHONDROPLASTY OF KNEE JOINT  8/13/2018    Procedure: ARTHROSCOPY, KNEE, WITH CHONDROPLASTY;  Surgeon: Jairo Cruz MD;  Location: [facility] OR;  Service: Orthopedics;;    HAND SURGERY Left     KNEE ARTHROSCOPY W/ MENISCECTOMY Right 8/13/2018    Procedure: ARTHROSCOPY, KNEE, WITH MENISCECTOMY;  Surgeon: Jairo Cruz MD;  Location: [facility] OR;  Service: Orthopedics;  Laterality: Right;    TUBAL LIGATION       Family History   Problem Relation Age of Onset    Cancer Mother     Cancer Father     Breast cancer Maternal Grandmother      Social History     Tobacco Use    Smoking status: Never Smoker    Smokeless  tobacco: Never Used    Tobacco comment: Stay smoke free.   Substance Use Topics    Alcohol use: No    Drug use: No     Review of Systems   Musculoskeletal: Positive for arthralgias.        + Arm pain.   All other systems reviewed and are negative.      Physical Exam     Initial Vitals   BP Pulse Resp Temp SpO2   12/14/19 0605 12/14/19 0605 12/14/19 0605 12/14/19 0605 12/14/19 0608   (!) (P) 143/81 (P) 93 (P) 20 (P) 97.7 °F (36.5 °C) 97 %      MAP       --                Physical Exam    Nursing note and vitals reviewed.  Constitutional: She appears well-developed and well-nourished. No distress.   HENT:   Head: Normocephalic and atraumatic.   Eyes: Conjunctivae and EOM are normal. Pupils are equal, round, and reactive to light.   Neck: Normal range of motion. Neck supple. No tracheal deviation present.   Cardiovascular: Normal rate, regular rhythm, normal heart sounds and intact distal pulses.   Pulmonary/Chest: Breath sounds normal. No respiratory distress. She has no wheezes. She has no rhonchi. She has no rales.   Abdominal: Soft. Bowel sounds are normal. She exhibits no distension. There is no tenderness.   Musculoskeletal: Normal range of motion. She exhibits tenderness. She exhibits no edema.   Decreased range of motion of left shoulder due to pain.  Tenderness of the anterior and lateral aspect of left shoulder.   Neurological: She is alert and oriented to person, place, and time. She has normal strength. No cranial nerve deficit.   Skin: Skin is warm and dry. Capillary refill takes less than 2 seconds.         ED Course   Procedures  Labs Reviewed - No data to display          Medical Decision Making:   ED Management:  59-year-old female with left shoulder and arm pain. She denies trauma. Patient had an x-ray of the shoulder done last month which shows chronic arthritic changes.  She was given a shot of Toradol here in the ED.  She has had no chest pain, and I have absolutely no suspicion of cardiac  etiology.  She will be placed on ibuprofen and Robaxin.  I have encouraged close follow-up with the primary physician.  Patient may also return to the ED for any worsening.                                 Clinical Impression:       ICD-10-CM ICD-9-CM   1. Chronic left shoulder pain M25.512 719.41    G89.29 338.29   2. Primary osteoarthritis of left shoulder M19.012 715.11         Disposition:   Disposition: Discharged  Condition: Stable    I, Dr. Mane Duenas, personally performed the services described in this documentation. All medical record entries made by the scribe were at my direction and in my presence. I have reviewed the chart and agree that the record reflects my personal performance and is accurate and complete. Mane Duenas MD.  9:28 AM 12/14/2019                   Mane Duenas MD  12/14/19 0937

## 2019-12-17 ENCOUNTER — OFFICE VISIT (OUTPATIENT)
Dept: ORTHOPEDICS | Facility: CLINIC | Age: 59
End: 2019-12-17
Payer: MEDICAID

## 2019-12-17 VITALS — WEIGHT: 261 LBS | HEIGHT: 66 IN | BODY MASS INDEX: 41.95 KG/M2

## 2019-12-17 DIAGNOSIS — M17.11 PRIMARY OSTEOARTHRITIS OF RIGHT KNEE: Primary | ICD-10-CM

## 2019-12-17 PROCEDURE — 99999 PR PBB SHADOW E&M-EST. PATIENT-LVL II: CPT | Mod: PBBFAC,,, | Performed by: ORTHOPAEDIC SURGERY

## 2019-12-17 PROCEDURE — 99212 OFFICE O/P EST SF 10 MIN: CPT | Mod: PBBFAC,PN | Performed by: ORTHOPAEDIC SURGERY

## 2019-12-17 PROCEDURE — 99213 PR OFFICE/OUTPT VISIT, EST, LEVL III, 20-29 MIN: ICD-10-PCS | Mod: S$PBB,,, | Performed by: ORTHOPAEDIC SURGERY

## 2019-12-17 PROCEDURE — 99999 PR PBB SHADOW E&M-EST. PATIENT-LVL II: ICD-10-PCS | Mod: PBBFAC,,, | Performed by: ORTHOPAEDIC SURGERY

## 2019-12-17 PROCEDURE — 99213 OFFICE O/P EST LOW 20 MIN: CPT | Mod: S$PBB,,, | Performed by: ORTHOPAEDIC SURGERY

## 2019-12-17 NOTE — PROGRESS NOTES
Subjective:      Patient ID: Mame Kumari is a 59 y.o. female.    Chief Complaint: Follow-up of the Right Knee    HPI    The patient reports her right knee is unchanged.  She complains of pain and numbness in her right lower extremity.  She denies mechanical symptoms. She does not feel that the injection was helpful.  She reports that her primary doctor took our off Neurontin.  She request pain medication.        Review of Systems   Constitution: Negative for fever and weight loss.   HENT: Negative for congestion.    Eyes: Negative for visual disturbance.   Cardiovascular: Negative for chest pain.   Respiratory: Negative for shortness of breath.    Hematologic/Lymphatic: Negative for bleeding problem. Does not bruise/bleed easily.   Skin: Negative for poor wound healing.   Musculoskeletal: Positive for joint pain.   Gastrointestinal: Negative for abdominal pain.   Genitourinary: Negative for dysuria.   Neurological: Negative for seizures.   Psychiatric/Behavioral: Negative for altered mental status.   Allergic/Immunologic: Negative for persistent infections.         Objective:      Ortho/SPM Exam    Right knee    The patient is not in acute distress.   Sclerae normal  Body habitus is overweight.  Respiratory distress:  none   The patient walks with a limp.  Hip irritability  negative.   The skin over the knee is intact.  Knee effusion 0  Tendernes is located medially to a mild degree  Range of motion- Flexion 120 deg, Extension 0 deg,   Ligament laxity exam:   MCL trace   Lachman 0   Post sag  0    LCL 0  Patellar apprehension negative.  Popliteal cyst negative  Patellar crepitation absent.  Flexion/pinch negative  Pulses DP present, PT present.  Motor normal 5/5 strength in all tested muscle groups.   Sensory normal.              Assessment:       No diagnosis found.     The condition is structurally mild.  Neuropathic pain may aggravate the symptomatology.          Plan:       There are no diagnoses linked  to this encounter.      I explained my diagnostic impression and the reasoning behind it in detail, using layman's terms.  Models and/or pictures were used to help in the explanation.    Considering the above I recommend nonsurgical treatment to include continued efforts at weight loss, activity modification-which I explained and over-the-counter analgesics.    Only if there are significant, progressive, objective findings with surgical intervention be recommended.

## 2020-02-13 ENCOUNTER — HOSPITAL ENCOUNTER (EMERGENCY)
Facility: HOSPITAL | Age: 60
Discharge: HOME | End: 2020-02-13
Attending: EMERGENCY MEDICINE
Payer: MEDICAID

## 2020-02-13 VITALS
HEART RATE: 84 BPM | SYSTOLIC BLOOD PRESSURE: 134 MMHG | WEIGHT: 261 LBS | OXYGEN SATURATION: 100 % | HEIGHT: 66 IN | TEMPERATURE: 99 F | BODY MASS INDEX: 41.95 KG/M2 | DIASTOLIC BLOOD PRESSURE: 82 MMHG | RESPIRATION RATE: 20 BRPM

## 2020-02-13 DIAGNOSIS — R10.9 LEFT FLANK PAIN: Primary | ICD-10-CM

## 2020-02-13 LAB
ALBUMIN SERPL BCP-MCNC: 3.8 G/DL (ref 3.5–5.2)
ALP SERPL-CCNC: 100 U/L (ref 55–135)
ALT SERPL W/O P-5'-P-CCNC: 12 U/L (ref 10–44)
ANION GAP SERPL CALC-SCNC: 11 MMOL/L (ref 8–16)
AST SERPL-CCNC: 16 U/L (ref 10–40)
BACTERIA #/AREA URNS HPF: NORMAL /HPF
BASOPHILS # BLD AUTO: 0.05 K/UL (ref 0–0.2)
BASOPHILS NFR BLD: 0.5 % (ref 0–1.9)
BILIRUB SERPL-MCNC: 0.4 MG/DL (ref 0.1–1)
BILIRUB UR QL STRIP: NEGATIVE
BUN SERPL-MCNC: 8 MG/DL (ref 6–20)
CALCIUM SERPL-MCNC: 9.1 MG/DL (ref 8.7–10.5)
CHLORIDE SERPL-SCNC: 104 MMOL/L (ref 95–110)
CLARITY UR: CLEAR
CO2 SERPL-SCNC: 24 MMOL/L (ref 23–29)
COLOR UR: YELLOW
CREAT SERPL-MCNC: 0.8 MG/DL (ref 0.5–1.4)
DIFFERENTIAL METHOD: ABNORMAL
EOSINOPHIL # BLD AUTO: 0.7 K/UL (ref 0–0.5)
EOSINOPHIL NFR BLD: 7.2 % (ref 0–8)
ERYTHROCYTE [DISTWIDTH] IN BLOOD BY AUTOMATED COUNT: 12.6 % (ref 11.5–14.5)
EST. GFR  (AFRICAN AMERICAN): >60 ML/MIN/1.73 M^2
EST. GFR  (NON AFRICAN AMERICAN): >60 ML/MIN/1.73 M^2
GLUCOSE SERPL-MCNC: 251 MG/DL (ref 70–110)
GLUCOSE UR QL STRIP: ABNORMAL
HCT VFR BLD AUTO: 38.7 % (ref 37–48.5)
HGB BLD-MCNC: 12.1 G/DL (ref 12–16)
HGB UR QL STRIP: NEGATIVE
IMM GRANULOCYTES # BLD AUTO: 0.03 K/UL (ref 0–0.04)
IMM GRANULOCYTES NFR BLD AUTO: 0.3 % (ref 0–0.5)
KETONES UR QL STRIP: NEGATIVE
LEUKOCYTE ESTERASE UR QL STRIP: NEGATIVE
LYMPHOCYTES # BLD AUTO: 3.8 K/UL (ref 1–4.8)
LYMPHOCYTES NFR BLD: 39.9 % (ref 18–48)
MCH RBC QN AUTO: 27.9 PG (ref 27–31)
MCHC RBC AUTO-ENTMCNC: 31.3 G/DL (ref 32–36)
MCV RBC AUTO: 89 FL (ref 82–98)
MICROSCOPIC COMMENT: NORMAL
MONOCYTES # BLD AUTO: 0.4 K/UL (ref 0.3–1)
MONOCYTES NFR BLD: 3.7 % (ref 4–15)
NEUTROPHILS # BLD AUTO: 4.7 K/UL (ref 1.8–7.7)
NEUTROPHILS NFR BLD: 48.4 % (ref 38–73)
NITRITE UR QL STRIP: NEGATIVE
NRBC BLD-RTO: 0 /100 WBC
PH UR STRIP: 8 [PH] (ref 5–8)
PLATELET # BLD AUTO: 243 K/UL (ref 150–350)
PMV BLD AUTO: 10.9 FL (ref 9.2–12.9)
POTASSIUM SERPL-SCNC: 3.9 MMOL/L (ref 3.5–5.1)
PROT SERPL-MCNC: 7.3 G/DL (ref 6–8.4)
PROT UR QL STRIP: NEGATIVE
RBC # BLD AUTO: 4.33 M/UL (ref 4–5.4)
RBC #/AREA URNS HPF: 1 /HPF (ref 0–4)
SODIUM SERPL-SCNC: 139 MMOL/L (ref 136–145)
SP GR UR STRIP: 1.02 (ref 1–1.03)
URN SPEC COLLECT METH UR: ABNORMAL
UROBILINOGEN UR STRIP-ACNC: 1 EU/DL
WBC # BLD AUTO: 9.61 K/UL (ref 3.9–12.7)
WBC #/AREA URNS HPF: 0 /HPF (ref 0–5)
YEAST URNS QL MICRO: NORMAL

## 2020-02-13 PROCEDURE — 85025 COMPLETE CBC W/AUTO DIFF WBC: CPT

## 2020-02-13 PROCEDURE — 80053 COMPREHEN METABOLIC PANEL: CPT

## 2020-02-13 PROCEDURE — 63600175 PHARM REV CODE 636 W HCPCS: Performed by: EMERGENCY MEDICINE

## 2020-02-13 PROCEDURE — 81000 URINALYSIS NONAUTO W/SCOPE: CPT

## 2020-02-13 PROCEDURE — 96374 THER/PROPH/DIAG INJ IV PUSH: CPT

## 2020-02-13 PROCEDURE — 99284 EMERGENCY DEPT VISIT MOD MDM: CPT | Mod: 25

## 2020-02-13 RX ORDER — KETOROLAC TROMETHAMINE 30 MG/ML
30 INJECTION, SOLUTION INTRAMUSCULAR; INTRAVENOUS
Status: COMPLETED | OUTPATIENT
Start: 2020-02-13 | End: 2020-02-13

## 2020-02-13 RX ORDER — TRAMADOL HYDROCHLORIDE 50 MG/1
50 TABLET ORAL EVERY 8 HOURS PRN
Qty: 9 TABLET | Refills: 0 | Status: SHIPPED | OUTPATIENT
Start: 2020-02-13 | End: 2020-02-16

## 2020-02-13 RX ORDER — ORPHENADRINE CITRATE 100 MG/1
100 TABLET, EXTENDED RELEASE ORAL 2 TIMES DAILY PRN
Qty: 20 TABLET | Refills: 0 | Status: SHIPPED | OUTPATIENT
Start: 2020-02-13 | End: 2021-05-03

## 2020-02-13 RX ADMIN — KETOROLAC TROMETHAMINE 30 MG: 30 INJECTION, SOLUTION INTRAMUSCULAR at 07:02

## 2020-02-14 NOTE — ED NOTES
Pt sitting on stretcher awaiting diagnostic tests results and MD disposition. Family members in room.

## 2020-02-14 NOTE — ED PROVIDER NOTES
Encounter Date: 2/13/2020    SCRIBE #1 NOTE: I, Palma Pettit, am scribing for, and in the presence of,  Dr. Karimi. I have scribed the entire note.       History     Chief Complaint   Patient presents with    Flank Pain     pt presents to ED today c/o left flank pain onset this am pt denies associated urinary symptoms          Patient is a 59-year-old  female who presents with a complaint of left sided flank pain. Patient states the pain started earlier this morning and describes pain as sharp and nonradiating.  Patient denies any urinary symptoms, abdominal pain, nausea, vomiting, fever chills, chest pain or shortness of breath. The patient denies having symptoms like this in the past.  Patient denies any history of kidney stones.  Patient denies taking anything for the aforementioned pain. Furthermore, patient denies any new rashes or recent illness.    The history is provided by the patient.     Review of patient's allergies indicates:   Allergen Reactions    Lantus [insulin glargine] Swelling     Face swelling and itching.    Penicillins Rash     Past Medical History:   Diagnosis Date    Arthritis     Asthma     CHF (congestive heart failure)     Diabetes mellitus     GERD (gastroesophageal reflux disease)     Hypertension     Neuropathy      Past Surgical History:   Procedure Laterality Date    ARTHROSCOPIC CHONDROPLASTY OF KNEE JOINT  8/13/2018    Procedure: ARTHROSCOPY, KNEE, WITH CHONDROPLASTY;  Surgeon: Jairo Cruz MD;  Location: [facility] OR;  Service: Orthopedics;;    HAND SURGERY Left     KNEE ARTHROSCOPY W/ MENISCECTOMY Right 8/13/2018    Procedure: ARTHROSCOPY, KNEE, WITH MENISCECTOMY;  Surgeon: Jairo Cruz MD;  Location: [facility] OR;  Service: Orthopedics;  Laterality: Right;    TUBAL LIGATION       Family History   Problem Relation Age of Onset    Cancer Mother     Cancer Father     Breast cancer Maternal Grandmother      Social History     Tobacco Use     Smoking status: Never Smoker    Smokeless tobacco: Never Used    Tobacco comment: Stay smoke free.   Substance Use Topics    Alcohol use: No    Drug use: No     Review of Systems   Constitutional: Negative for chills and fever.   HENT: Negative for congestion, rhinorrhea and sore throat.    Eyes: Negative for redness and visual disturbance.   Respiratory: Negative for cough, shortness of breath and wheezing.    Cardiovascular: Negative for chest pain and palpitations.   Gastrointestinal: Negative for abdominal pain, diarrhea, nausea and vomiting.   Genitourinary: Positive for flank pain (left). Negative for dysuria and hematuria.   Musculoskeletal: Negative for back pain, myalgias and neck pain.   Skin: Negative for rash.   Neurological: Negative for dizziness, weakness and light-headedness.   Psychiatric/Behavioral: Negative for confusion.       Physical Exam     Initial Vitals [02/13/20 1723]   BP Pulse Resp Temp SpO2   (!) 185/109 94 18 97.6 °F (36.4 °C) 100 %      MAP       --         Physical Exam    Nursing note and vitals reviewed.  Constitutional: She appears well-developed and well-nourished. She is not diaphoretic. No distress.   HENT:   Head: Normocephalic and atraumatic.   Right Ear: Tympanic membrane normal.   Left Ear: Tympanic membrane normal.   Mouth/Throat: Oropharynx is clear and moist.   Eyes: Conjunctivae and EOM are normal. Pupils are equal, round, and reactive to light.   Neck: Normal range of motion. Neck supple.   Cardiovascular: Normal rate, regular rhythm and normal heart sounds. Exam reveals no gallop and no friction rub.    No murmur heard.  Pulmonary/Chest: Breath sounds normal. She has no wheezes. She has no rhonchi. She has no rales.   Abdominal: Soft. Bowel sounds are normal. There is no tenderness. There is CVA tenderness (left). There is no rebound and no guarding.   Left CVA tenderness  Obese abdomen  No rebound  No guarding   Normal bowel sounds in all four quadrants      Musculoskeletal: Normal range of motion. She exhibits no edema or tenderness.   Lymphadenopathy:     She has no cervical adenopathy.   Neurological: She is alert and oriented to person, place, and time. She has normal strength.   Skin: Skin is warm and dry. Capillary refill takes less than 2 seconds. No rash noted.   No rashes or anything suggestive of zoster         ED Course   Procedures  Labs Reviewed   URINALYSIS, REFLEX TO URINE CULTURE - Abnormal; Notable for the following components:       Result Value    Glucose, UA 3+ (*)     All other components within normal limits    Narrative:     Preferred Collection Type->Urine, Clean Catch   CBC W/ AUTO DIFFERENTIAL - Abnormal; Notable for the following components:    Mean Corpuscular Hemoglobin Conc 31.3 (*)     Eos # 0.7 (*)     Mono% 3.7 (*)     All other components within normal limits   COMPREHENSIVE METABOLIC PANEL - Abnormal; Notable for the following components:    Glucose 251 (*)     All other components within normal limits   URINALYSIS MICROSCOPIC    Narrative:     Preferred Collection Type->Urine, Clean Catch          Imaging Results          CT Renal Stone Study ABD Pelvis WO (Final result)  Result time 02/13/20 21:25:08    Final result by Michaela Tovar MD (02/13/20 21:25:08)                 Impression:      1. No acute intra-abdominal abnormalities identified.  No evidence of stones or obstructive uropathy.  2. Hepatomegaly.      Electronically signed by: Michaela Tovar MD  Date:    02/13/2020  Time:    21:25             Narrative:    EXAMINATION:  CT RENAL STONE STUDY ABD PELVIS WO    CLINICAL HISTORY:  Flank pain, stone disease suspected;    TECHNIQUE:  Low dose axial images, sagittal and coronal reformations were obtained from the lung bases to the pubic symphysis.  Contrast was not administered.    COMPARISON:  None    FINDINGS:  The visualized portion of the heart is unremarkable.  The lung bases are clear.    Liver is enlarged measuring 24  cm.  There is no intra-or extrahepatic biliary ductal dilatation.  The gallbladder is unremarkable.  The stomach, pancreas, spleen, and adrenal glands are unremarkable.    Kidneys show no evidence of stones or hydronephrosis. Ureters are unremarkable along their courses.  Urinary bladder and uterus are unremarkable.    Appendix is visualized and is unremarkable.  The visualized loops of small and large bowel show no evidence of obstruction or inflammation.  No free air or free fluid.    Aorta tapers normally.    No acute osseous abnormality identified.  Degenerative changes are seen with lower lumbar facet arthropathy.  Subcutaneous soft tissue structures are unremarkable.                                 Medical Decision Making:   Clinical Tests:   Lab Tests: Ordered and Reviewed  ED Management:  - VSS; NAD; afebrile  - physical exam notable for L sided flank pain  - laboratory workup unremarkable  - UA without findings suggestive of infection or other abnormality   - CT renal stone study demonstrates no acute intra-abdominal abnormalities identified; no evidence of stones or obstructive uropathy; hepatomegaly per final radiology read  - will treat as OP with conservative measures as advanced imaging shows no acute abnormalities   - pt in agreement with plan as noted above  - pt stable for discharge  - No further intervention is indicated at this time after having taken into account the patient's history, physical exam findings, and empirical and objective data obtained during the patient's emergency department workup.   - The patient is at low risk for an emergent medical condition at this time, and I am of the belief that that it is safe to discharge the patient from the emergency department.   - The patient is instructed to follow up as outpatient as indicated on the discharge paperwork.    - I have discussed the specifics of the workup with the patient and the patient has verbalized understanding of the details  of the workup, the diagnosis, the treatment plan, and the need for outpatient follow-up.    - Although the patient has no emergent etiology today this does not preclude the development of an emergent condition so, in addition, I have advised the patient that they can return to the ED and/or activate EMS at any time with worsening of their symptoms, change of their symptoms, or with any other medical complaint.    - The patient remained comfortable and stable during their visit in the ED.    - Discharge and follow-up instructions discussed with the patient who expressed understanding and willingness to comply with my recommendations.  - Results of all emergency department tests  discussed thoroughly with patient; all patient questions answered; pt in agreement with plan  - Pt instructed to follow up with PCP in 2-3 days for recheck of today's complaints  - Pt given strict emergency department return precautions for any new or worsening of symptoms  - Pt discharged from the emergency department in stable condition, in no acute distress                                     Clinical Impression:     1. Left flank pain             I, Yifan Karimi,  personally performed the services described in this documentation. All medical record entries made by the scribe were at my direction and in my presence.  I have reviewed the chart and agree that the record reflects my personal performance and is accurate and complete. Yifan Karimi M.D. 2:45 AM02/14/2020                 Yifan Karimi MD  02/14/20 0245

## 2020-02-14 NOTE — ED NOTES
Called CT and spoke to Moncho to verify they recd CT order.He states he has order and will be here ASAP.

## 2020-02-17 ENCOUNTER — TELEPHONE (OUTPATIENT)
Dept: CARDIOLOGY | Facility: CLINIC | Age: 60
End: 2020-02-17

## 2020-02-17 NOTE — TELEPHONE ENCOUNTER
I called the patient to schedule her referral for Dr. Eduardo at the [place] location.  Appointment is scheduled on Friday March 13, 2020 at 9am in [place].    ND

## 2020-03-13 ENCOUNTER — OFFICE VISIT (OUTPATIENT)
Dept: CARDIOLOGY | Facility: CLINIC | Age: 60
End: 2020-03-13
Payer: MEDICAID

## 2020-03-13 VITALS
WEIGHT: 260.69 LBS | HEART RATE: 96 BPM | DIASTOLIC BLOOD PRESSURE: 79 MMHG | SYSTOLIC BLOOD PRESSURE: 136 MMHG | HEIGHT: 66 IN | BODY MASS INDEX: 41.9 KG/M2 | OXYGEN SATURATION: 99 %

## 2020-03-13 DIAGNOSIS — E66.01 MORBID OBESITY: ICD-10-CM

## 2020-03-13 DIAGNOSIS — R06.09 DYSPNEA ON EXERTION: ICD-10-CM

## 2020-03-13 DIAGNOSIS — R07.1 CHEST PAIN ON BREATHING: Primary | ICD-10-CM

## 2020-03-13 PROCEDURE — 99204 PR OFFICE/OUTPT VISIT, NEW, LEVL IV, 45-59 MIN: ICD-10-PCS | Mod: S$GLB,,, | Performed by: STUDENT IN AN ORGANIZED HEALTH CARE EDUCATION/TRAINING PROGRAM

## 2020-03-13 PROCEDURE — 99204 OFFICE O/P NEW MOD 45 MIN: CPT | Mod: S$GLB,,, | Performed by: STUDENT IN AN ORGANIZED HEALTH CARE EDUCATION/TRAINING PROGRAM

## 2020-03-13 NOTE — PROGRESS NOTES
"   Cardiology Clinic note    Subjective:   Patient ID:  Mame Kumari is a 59 y.o. female who presents for evaluation of chest pain, TERRY    HPI:   Mame Kumari  has a past medical history of Arthritis, Asthma, CHF (congestive heart failure), Diabetes mellitus, GERD (gastroesophageal reflux disease), Hypertension, and Neuropathy.   Pt states she was diagnosis with HF in the past  2000 but can seen testing.  She presents from PCP for eval of subacute chest pain and TERRY>  Chest pain occurs at rest, while watching tv. Improves with SL nitro. She takes her nitro about 2-3x a week  NO hx of MI, CAD  - states she had a stress testing the past but couldn't walk due to TERRY.    Pt states she gets SOB with ADOL. No hx of cath. Prior diagnosis with HTN but stopped taking losartan due to the way it made her feel.  States she wants to know if she can continue taking lasix as needed for edema. No edema today      Vitals  Vitals:    03/13/20 0923   BP: 136/79   Pulse: 96   SpO2: 99%   Weight: 118.3 kg (260 lb 11.2 oz)   Height: 5' 6" (1.676 m)       Patient Active Problem List    Diagnosis Date Noted    Decreased range of motion (ROM) of knee 09/17/2018    Decreased strength involving knee joint 09/17/2018    Impaired functional mobility, balance, gait, and endurance 09/17/2018    Internal derangement of knee joint, right 08/13/2018    DM2 (diabetes mellitus, type 2) 06/09/2016    Musculoskeletal pain 02/10/2016    Asthma, chronic 03/12/2013    Type II or unspecified type diabetes mellitus without mention of complication, not stated as uncontrolled 03/12/2013    Hypertension     Arthritis     GERD (gastroesophageal reflux disease)        Patient's Medications   New Prescriptions    No medications on file   Previous Medications    ALBUTEROL (ACCUNEB) 0.63 MG/3 ML NEBU    Take 3 mLs (0.63 mg total) by nebulization every 6 (six) hours as needed.    DICLOFENAC (VOLTAREN) 50 MG EC TABLET    Take 1 tablet (50 mg " "total) by mouth 2 (two) times daily.    DULOXETINE (CYMBALTA) 30 MG CAPSULE    TK 1 C PO QD    FUROSEMIDE (LASIX) 40 MG TABLET    Take 40 mg by mouth once daily.     GLYBURIDE (DIABETA) 2.5 MG TABLET        IBUPROFEN (ADVIL,MOTRIN) 600 MG TABLET    Take 1 tablet (600 mg total) by mouth every 6 (six) hours as needed for Pain.    MUPIROCIN (BACTROBAN) 2 % OINTMENT    Apply topically 3 (three) times daily.    NITROGLYCERIN (NITROSTAT) 0.4 MG SL TABLET        NOVOFINE 32 32 X 1/4 " NDLE        ONDANSETRON (ZOFRAN-ODT) 4 MG TBDL    Take 1 tablet (4 mg total) by mouth every 8 (eight) hours as needed (Nausea/vomiting).    ORPHENADRINE (NORFLEX) 100 MG TABLET    Take 1 tablet (100 mg total) by mouth 2 (two) times daily as needed for Pain.    QUETIAPINE (SEROQUEL) 100 MG TAB    Take 100 mg by mouth every evening.     QUETIAPINE (SEROQUEL) 200 MG TAB    TK 1 T PO QHS    TIZANIDINE (ZANAFLEX) 4 MG TABLET    Take 1 tablet (4 mg total) by mouth every 6 (six) hours as needed (muscle pain/spasms).    TRUE METRIX GLUCOSE TEST STRIP STRP        ULTICARE PEN NEEDLE 31 GAUGE X 1/4" NDLE        VENTOLIN HFA 90 MCG/ACTUATION INHALER       Modified Medications    No medications on file   Discontinued Medications    No medications on file         Review of Systems   Constitution: Positive for malaise/fatigue and weight gain. Negative for chills and decreased appetite.   HENT: Negative for congestion and ear discharge.    Eyes: Negative for blurred vision and double vision.   Cardiovascular: Positive for chest pain and dyspnea on exertion. Negative for cyanosis, irregular heartbeat, leg swelling, near-syncope, orthopnea, palpitations and syncope.   Respiratory: Negative for cough, shortness of breath and sleep disturbances due to breathing.    Skin: Negative for color change and dry skin.   Musculoskeletal: Negative for joint pain, joint swelling and muscle cramps.   Gastrointestinal: Negative for bloating, heartburn, hematemesis and " hematochezia.   Genitourinary: Negative for bladder incontinence and dysuria.   Neurological: Negative for aphonia, excessive daytime sleepiness, dizziness, focal weakness, headaches, light-headedness, loss of balance and weakness.   Psychiatric/Behavioral: Negative for altered mental status, depression and memory loss. The patient does not have insomnia and is not nervous/anxious.          Objective:   Physical Exam   Constitutional: She is oriented to person, place, and time. She appears well-developed and well-nourished.   HENT:   Head: Normocephalic and atraumatic.   Eyes: Conjunctivae and EOM are normal.   Neck: Normal range of motion. Neck supple. No JVD present.   Cardiovascular: Normal rate, regular rhythm and normal heart sounds.   No murmur heard.  Pulmonary/Chest: Effort normal and breath sounds normal. No respiratory distress. She has no wheezes. She has no rales.   Abdominal: Soft. Bowel sounds are normal. She exhibits no distension.   Musculoskeletal: Normal range of motion. She exhibits no edema.   Neurological: She is alert and oriented to person, place, and time.   Skin: Skin is warm and dry. No erythema.   Psychiatric: She has a normal mood and affect. Her behavior is normal. Judgment and thought content normal.   Nursing note and vitals reviewed.      Lab Results    Lab Results   Component Value Date     02/13/2020    K 3.9 02/13/2020     02/13/2020    CO2 24 02/13/2020    BUN 8 02/13/2020    CREATININE 0.8 02/13/2020     (H) 02/13/2020    HGBA1C 9.8 (H) 05/17/2018    AST 16 02/13/2020    ALT 12 02/13/2020    ALBUMIN 3.8 02/13/2020    PROT 7.3 02/13/2020    BILITOT 0.4 02/13/2020    WBC 9.61 02/13/2020    HGB 12.1 02/13/2020    HCT 38.7 02/13/2020    MCV 89 02/13/2020     02/13/2020    INR 1.0 07/26/2016    TSH 1.510 05/17/2018    CHOL 181 05/17/2018    HDL 37 (L) 05/17/2018    LDLCALC Invalid, Trig>400.0 05/17/2018    TRIG 433 (H) 05/17/2018    CRP 16.3 (H) 06/10/2016     BNP 22 07/26/2016       Lipid panel  Lab Results   Component Value Date    CHOL 181 05/17/2018     Lab Results   Component Value Date    HDL 37 (L) 05/17/2018     Lab Results   Component Value Date    LDLCALC Invalid, Trig>400.0 05/17/2018     Lab Results   Component Value Date    TRIG 433 (H) 05/17/2018       Cardiac Studies  Significant Imaging: Echocardiogram: 2D echo with color flow doppler: No results found for this or any previous visit. and Transthoracic echo (TTE) complete (Cupid Only): No results found for this or any previous visit.  ECG:  normal EKG, normal sinus rhythm, unchanged from previous tracings.    Cath study : n/a    Assessment:     1. Chest pain on breathing    2. Dyspnea on exertion        Plan:     1. Atypical/noncardiac chest pain  - only + is relief with nitro, (CP is at rest, atypical in nature)  - with RF of obesity, Dm, will get lexiscan stress    2. TERRY  - likely exercise intolerance  - will get echo  - normal SBP today    3. Obesity  I spent 5-10 minutes asking, assessing, assisting, arranging and advising heart healthy diet improvements. This included low-salt meals, portion control and health food alternatives. I also encourage 30 minutes of moderate exercise 3-4x a week.     Continue with current medical plan and lifestyle changes.  Return sooner for concerns or questions. If symptoms persist go to the ED  Total duration of face to face visit time 30 minutes.  Total time spent counseling greater than fifty percent of total visit time.  Counseling included discussion regarding imaging findings, diagnosis, possibilities, treatment options, risks and benefits.      Orders Placed This Encounter   Procedures    NM Myocardial Perfusion Spect Multi Pharmacologic     Standing Status:   Future     Standing Expiration Date:   3/13/2021     Order Specific Question:   May the Radiologist modify the order per protocol to meet the clinical needs of the patient?     Answer:   Yes     Order  Specific Question:   Stress Medication to use:     Answer:   Regadenoson     Order Specific Question:   Diabetes?     Answer:   Yes     Order Specific Question:   Will a Cardiologist read this study?     Answer:   No    Nuclear Stress Test     Standing Status:   Future     Standing Expiration Date:   3/13/2021     Order Specific Question:   Which stress agent will be used     Answer:   Pharm     Order Specific Question:   Which medicaton for the stress procedure?     Answer:   Regadenoson    Echo Color Flow Doppler? Yes     Standing Status:   Future     Standing Expiration Date:   3/13/2021     Order Specific Question:   Color Flow Doppler?     Answer:   Yes       Follow up as scheduled. Return to clinic in 4 weeks, review studies at that time.   She expressed verbal understanding and agreed with the plan    Thank you for the opportunity to care for this patient. Will be available for questions if needed.     Philippe Eduardo MD [facility]  Interventional Cardiology  [facility]  Pager: [phone]

## 2020-03-13 NOTE — LETTER
March 13, 2020      Janet Delong MD  [address] [address]  [place] [address]           [facility]iology  [address]  [place] [address]  Phone: [phone]  Fax: [phone]          Patient: Mame Kumari   MR Number: [id number]   YOB: 1960   Date of Visit: 3/13/2020       Dear Dr. Janet Delong:    Thank you for referring Mame Kumari to me for evaluation. Attached you will find relevant portions of my assessment and plan of care.    If you have questions, please do not hesitate to call me. I look forward to following Mame Kumari along with you.    Sincerely,    Philippe Eduardo MD    Enclosure  CC:  No Recipients    If you would like to receive this communication electronically, please contact [email or web address] or [phone] to request more information on [employer] Link access.    For providers and/or their staff who would like to refer a patient to [facility], please contact us through our one-stop-shop provider referral line, [facility] , at [phone].    If you feel you have received this communication in error or would no longer like to receive these types of communications, please e-mail [email or web address]

## 2020-03-19 ENCOUNTER — TELEPHONE (OUTPATIENT)
Dept: CARDIOLOGY | Facility: CLINIC | Age: 60
End: 2020-03-19

## 2020-04-15 ENCOUNTER — TELEPHONE (OUTPATIENT)
Dept: CARDIOLOGY | Facility: CLINIC | Age: 60
End: 2020-04-15

## 2020-04-15 NOTE — TELEPHONE ENCOUNTER
Reached out to pt in regards to rescheduling clinical visit with Dr. Collins due to COVID-9 concerns     Pt declined option to schedule virtual visit, rescheduled steven, slip mailed out

## 2020-05-01 ENCOUNTER — TELEPHONE (OUTPATIENT)
Dept: ORTHOPEDICS | Facility: CLINIC | Age: 60
End: 2020-05-01

## 2020-05-01 NOTE — TELEPHONE ENCOUNTER
----- Message from Rahel Alejo sent at 5/1/2020 12:13 PM CDT -----  Contact: patient  Type: Appointment Request    Caller is requesting an appointment.    Name of Caller: patient  Reason for appointment: knee problems  Would the patient rather a call back or a response via [facility]? Call back  Best Call Back Number: [phone]  Additional Information: apt for next week

## 2020-05-06 ENCOUNTER — TELEPHONE (OUTPATIENT)
Dept: CARDIOLOGY | Facility: CLINIC | Age: 60
End: 2020-05-06

## 2020-05-06 NOTE — TELEPHONE ENCOUNTER
----- Message from Di Soliman MA sent at 5/5/2020 10:17 AM CDT -----  Regarding: nuclear stress test PA  Sameer Rosado!  Josi from [place] called to follow up in regards to the PA for the nuclear stress test this pt was scheduled to have back in May.    Thank you,  Di

## 2020-05-06 NOTE — TELEPHONE ENCOUNTER
Aurelia notified that the referral for the patient has been re entered and I will fax over the authorization once it has been approved.

## 2020-06-05 ENCOUNTER — OUTSIDE PLACE OF SERVICE (OUTPATIENT)
Dept: CARDIOLOGY | Facility: CLINIC | Age: 60
End: 2020-06-05
Payer: MEDICAID

## 2020-06-05 PROCEDURE — 93010 PR ELECTROCARDIOGRAM REPORT: ICD-10-PCS | Mod: ,,, | Performed by: INTERNAL MEDICINE

## 2020-06-05 PROCEDURE — 93010 ELECTROCARDIOGRAM REPORT: CPT | Mod: ,,, | Performed by: INTERNAL MEDICINE

## 2020-10-02 ENCOUNTER — TELEPHONE (OUTPATIENT)
Dept: ORTHOPEDICS | Facility: CLINIC | Age: 60
End: 2020-10-02

## 2020-10-02 DIAGNOSIS — M25.561 RIGHT KNEE PAIN, UNSPECIFIED CHRONICITY: Primary | ICD-10-CM

## 2021-02-22 ENCOUNTER — OFFICE VISIT (OUTPATIENT)
Dept: PODIATRY | Facility: CLINIC | Age: 61
End: 2021-02-22
Payer: MEDICAID

## 2021-02-22 VITALS
HEIGHT: 66 IN | DIASTOLIC BLOOD PRESSURE: 82 MMHG | WEIGHT: 266.88 LBS | HEART RATE: 85 BPM | SYSTOLIC BLOOD PRESSURE: 160 MMHG | BODY MASS INDEX: 42.89 KG/M2

## 2021-02-22 DIAGNOSIS — L85.3 XEROSIS OF SKIN: ICD-10-CM

## 2021-02-22 DIAGNOSIS — B35.1 TINEA UNGUIUM: ICD-10-CM

## 2021-02-22 DIAGNOSIS — E11.49 TYPE II DIABETES MELLITUS WITH NEUROLOGICAL MANIFESTATIONS: Primary | ICD-10-CM

## 2021-02-22 PROCEDURE — 99203 OFFICE O/P NEW LOW 30 MIN: CPT | Mod: S$PBB,,, | Performed by: PODIATRIST

## 2021-02-22 PROCEDURE — 99203 PR OFFICE/OUTPT VISIT, NEW, LEVL III, 30-44 MIN: ICD-10-PCS | Mod: S$PBB,,, | Performed by: PODIATRIST

## 2021-02-22 PROCEDURE — 99999 PR PBB SHADOW E&M-EST. PATIENT-LVL III: ICD-10-PCS | Mod: PBBFAC,,, | Performed by: PODIATRIST

## 2021-02-22 PROCEDURE — 99999 PR PBB SHADOW E&M-EST. PATIENT-LVL III: CPT | Mod: PBBFAC,,, | Performed by: PODIATRIST

## 2021-02-22 PROCEDURE — 99213 OFFICE O/P EST LOW 20 MIN: CPT | Mod: PBBFAC,PN | Performed by: PODIATRIST

## 2021-02-22 RX ORDER — PROMETHAZINE HYDROCHLORIDE AND DEXTROMETHORPHAN HYDROBROMIDE 6.25; 15 MG/5ML; MG/5ML
SYRUP ORAL
COMMUNITY
End: 2022-09-07

## 2021-02-22 RX ORDER — PIROXICAM 10 MG/1
CAPSULE ORAL
COMMUNITY
End: 2022-09-07

## 2021-02-22 RX ORDER — TIMOLOL MALEATE 5 MG/ML
SOLUTION/ DROPS OPHTHALMIC
COMMUNITY

## 2021-02-22 RX ORDER — PAROXETINE HYDROCHLORIDE 20 MG/1
TABLET, FILM COATED ORAL
COMMUNITY
End: 2022-09-07

## 2021-02-22 RX ORDER — ZOLPIDEM TARTRATE 10 MG/1
TABLET ORAL
COMMUNITY
Start: 2020-12-08 | End: 2022-09-07

## 2021-02-22 RX ORDER — METHOCARBAMOL 500 MG/1
TABLET, FILM COATED ORAL
COMMUNITY
End: 2021-05-03

## 2021-02-22 RX ORDER — TRAMADOL HYDROCHLORIDE 50 MG/1
TABLET ORAL
COMMUNITY
End: 2022-09-07 | Stop reason: SDUPTHER

## 2021-02-22 RX ORDER — TEMAZEPAM 30 MG/1
CAPSULE ORAL
COMMUNITY
End: 2021-05-03

## 2021-02-22 RX ORDER — AMMONIUM LACTATE 12 G/100G
1 CREAM TOPICAL 2 TIMES DAILY
Qty: 140 G | Refills: 2 | Status: SHIPPED | OUTPATIENT
Start: 2021-02-22 | End: 2022-09-07

## 2021-02-22 RX ORDER — PANTOPRAZOLE SODIUM 40 MG/1
TABLET, DELAYED RELEASE ORAL
COMMUNITY

## 2021-02-22 RX ORDER — SULINDAC 200 MG/1
TABLET ORAL
COMMUNITY
End: 2021-05-03

## 2021-02-22 RX ORDER — PROMETHAZINE HYDROCHLORIDE 25 MG/1
TABLET ORAL
COMMUNITY

## 2021-03-02 ENCOUNTER — TELEPHONE (OUTPATIENT)
Dept: ORTHOPEDICS | Facility: CLINIC | Age: 61
End: 2021-03-02

## 2021-03-02 ENCOUNTER — OFFICE VISIT (OUTPATIENT)
Dept: ORTHOPEDICS | Facility: CLINIC | Age: 61
End: 2021-03-02
Payer: MEDICAID

## 2021-03-02 VITALS — HEIGHT: 66 IN | BODY MASS INDEX: 42.91 KG/M2 | WEIGHT: 267 LBS

## 2021-03-02 DIAGNOSIS — M17.11 PRIMARY OSTEOARTHRITIS OF RIGHT KNEE: Primary | ICD-10-CM

## 2021-03-02 DIAGNOSIS — Z41.9 SURGERY, ELECTIVE: ICD-10-CM

## 2021-03-02 DIAGNOSIS — Z96.651 S/P TOTAL KNEE ARTHROPLASTY, RIGHT: Primary | ICD-10-CM

## 2021-03-02 PROCEDURE — 99999 PR PBB SHADOW E&M-EST. PATIENT-LVL IV: CPT | Mod: PBBFAC,,, | Performed by: ORTHOPAEDIC SURGERY

## 2021-03-02 PROCEDURE — 99214 OFFICE O/P EST MOD 30 MIN: CPT | Mod: S$PBB,,, | Performed by: ORTHOPAEDIC SURGERY

## 2021-03-02 PROCEDURE — 99214 OFFICE O/P EST MOD 30 MIN: CPT | Mod: PBBFAC,PN | Performed by: ORTHOPAEDIC SURGERY

## 2021-03-02 PROCEDURE — 99214 PR OFFICE/OUTPT VISIT, EST, LEVL IV, 30-39 MIN: ICD-10-PCS | Mod: S$PBB,,, | Performed by: ORTHOPAEDIC SURGERY

## 2021-03-02 PROCEDURE — 99999 PR PBB SHADOW E&M-EST. PATIENT-LVL IV: ICD-10-PCS | Mod: PBBFAC,,, | Performed by: ORTHOPAEDIC SURGERY

## 2021-03-10 ENCOUNTER — TELEPHONE (OUTPATIENT)
Dept: ORTHOPEDICS | Facility: CLINIC | Age: 61
End: 2021-03-10

## 2021-03-11 DIAGNOSIS — M17.11 PRIMARY OSTEOARTHRITIS OF RIGHT KNEE: Primary | ICD-10-CM

## 2021-03-11 RX ORDER — ACETAMINOPHEN 325 MG/1
1000 TABLET ORAL
Status: CANCELLED | OUTPATIENT
Start: 2021-03-11 | End: 2021-03-11

## 2021-03-11 RX ORDER — MUPIROCIN 20 MG/G
OINTMENT TOPICAL
Status: CANCELLED | OUTPATIENT
Start: 2021-03-11

## 2021-03-11 RX ORDER — SODIUM CHLORIDE 0.9 % (FLUSH) 0.9 %
10 SYRINGE (ML) INJECTION
Status: CANCELLED | OUTPATIENT
Start: 2021-03-11

## 2021-03-11 RX ORDER — PREGABALIN 50 MG/1
150 CAPSULE ORAL
Status: CANCELLED | OUTPATIENT
Start: 2021-03-11 | End: 2021-03-11

## 2021-03-12 ENCOUNTER — TELEPHONE (OUTPATIENT)
Dept: CARDIOLOGY | Facility: CLINIC | Age: 61
End: 2021-03-12

## 2021-04-16 DIAGNOSIS — E11.9 TYPE 2 DIABETES MELLITUS WITHOUT COMPLICATION, WITHOUT LONG-TERM CURRENT USE OF INSULIN: ICD-10-CM

## 2021-04-16 DIAGNOSIS — M17.11 PRIMARY OSTEOARTHRITIS OF RIGHT KNEE: Primary | ICD-10-CM

## 2021-04-27 ENCOUNTER — TELEPHONE (OUTPATIENT)
Dept: ORTHOPEDICS | Facility: CLINIC | Age: 61
End: 2021-04-27

## 2021-05-03 ENCOUNTER — OFFICE VISIT (OUTPATIENT)
Dept: ORTHOPEDICS | Facility: CLINIC | Age: 61
End: 2021-05-03
Payer: MEDICAID

## 2021-05-03 ENCOUNTER — TELEPHONE (OUTPATIENT)
Dept: ORTHOPEDICS | Facility: CLINIC | Age: 61
End: 2021-05-03

## 2021-05-03 VITALS
RESPIRATION RATE: 20 BRPM | WEIGHT: 260 LBS | SYSTOLIC BLOOD PRESSURE: 132 MMHG | DIASTOLIC BLOOD PRESSURE: 74 MMHG | BODY MASS INDEX: 41.97 KG/M2 | HEART RATE: 80 BPM

## 2021-05-03 DIAGNOSIS — M17.11 PRIMARY OSTEOARTHRITIS OF RIGHT KNEE: Primary | ICD-10-CM

## 2021-05-03 PROCEDURE — 99499 NO LOS: ICD-10-PCS | Mod: S$PBB,,, | Performed by: PHYSICIAN ASSISTANT

## 2021-05-03 PROCEDURE — 99214 OFFICE O/P EST MOD 30 MIN: CPT | Mod: PBBFAC,PN | Performed by: PHYSICIAN ASSISTANT

## 2021-05-03 PROCEDURE — 99999 PR PBB SHADOW E&M-EST. PATIENT-LVL IV: ICD-10-PCS | Mod: PBBFAC,,, | Performed by: PHYSICIAN ASSISTANT

## 2021-05-03 PROCEDURE — 99999 PR PBB SHADOW E&M-EST. PATIENT-LVL IV: CPT | Mod: PBBFAC,,, | Performed by: PHYSICIAN ASSISTANT

## 2021-05-03 PROCEDURE — 99499 UNLISTED E&M SERVICE: CPT | Mod: S$PBB,,, | Performed by: PHYSICIAN ASSISTANT

## 2021-05-05 ENCOUNTER — TELEPHONE (OUTPATIENT)
Dept: ORTHOPEDICS | Facility: CLINIC | Age: 61
End: 2021-05-05

## 2021-05-17 ENCOUNTER — HOSPITAL ENCOUNTER (EMERGENCY)
Facility: HOSPITAL | Age: 61
Discharge: HOME | End: 2021-05-17
Attending: EMERGENCY MEDICINE
Payer: MEDICAID

## 2021-05-17 VITALS
OXYGEN SATURATION: 98 % | BODY MASS INDEX: 40.82 KG/M2 | RESPIRATION RATE: 17 BRPM | HEIGHT: 66 IN | TEMPERATURE: 98 F | DIASTOLIC BLOOD PRESSURE: 84 MMHG | WEIGHT: 254 LBS | SYSTOLIC BLOOD PRESSURE: 168 MMHG | HEART RATE: 94 BPM

## 2021-05-17 DIAGNOSIS — M54.2 NECK PAIN: Primary | ICD-10-CM

## 2021-05-17 LAB
BILIRUB UR QL STRIP: NEGATIVE
CLARITY UR: ABNORMAL
COLOR UR: YELLOW
GLUCOSE UR QL STRIP: ABNORMAL
HGB UR QL STRIP: NEGATIVE
KETONES UR QL STRIP: NEGATIVE
LEUKOCYTE ESTERASE UR QL STRIP: NEGATIVE
NITRITE UR QL STRIP: NEGATIVE
PH UR STRIP: 5 [PH] (ref 5–8)
POCT GLUCOSE: 297 MG/DL (ref 70–110)
PROT UR QL STRIP: NEGATIVE
SP GR UR STRIP: 1.02 (ref 1–1.03)
URN SPEC COLLECT METH UR: ABNORMAL
UROBILINOGEN UR STRIP-ACNC: NEGATIVE EU/DL

## 2021-05-17 PROCEDURE — 99283 EMERGENCY DEPT VISIT LOW MDM: CPT | Mod: 25

## 2021-05-17 PROCEDURE — 81003 URINALYSIS AUTO W/O SCOPE: CPT | Performed by: PHYSICIAN ASSISTANT

## 2021-05-17 PROCEDURE — 82962 GLUCOSE BLOOD TEST: CPT

## 2021-05-17 RX ORDER — METHOCARBAMOL 500 MG/1
500 TABLET, FILM COATED ORAL 3 TIMES DAILY
Qty: 15 TABLET | Refills: 0 | Status: SHIPPED | OUTPATIENT
Start: 2021-05-17 | End: 2021-05-22

## 2021-05-17 RX ORDER — ACETAMINOPHEN 325 MG/1
650 TABLET ORAL
Status: DISCONTINUED | OUTPATIENT
Start: 2021-05-17 | End: 2021-05-17 | Stop reason: HOSPADM

## 2021-06-13 DIAGNOSIS — M17.11 PRIMARY OSTEOARTHRITIS OF RIGHT KNEE: Primary | ICD-10-CM

## 2021-06-13 DIAGNOSIS — E11.9 TYPE 2 DIABETES MELLITUS WITHOUT COMPLICATION, WITHOUT LONG-TERM CURRENT USE OF INSULIN: ICD-10-CM

## 2021-06-19 ENCOUNTER — HOSPITAL ENCOUNTER (EMERGENCY)
Facility: HOSPITAL | Age: 61
Discharge: HOME | End: 2021-06-19
Attending: EMERGENCY MEDICINE
Payer: MEDICAID

## 2021-06-19 VITALS
RESPIRATION RATE: 16 BRPM | SYSTOLIC BLOOD PRESSURE: 148 MMHG | OXYGEN SATURATION: 100 % | HEART RATE: 94 BPM | TEMPERATURE: 98 F | BODY MASS INDEX: 40.51 KG/M2 | WEIGHT: 251 LBS | DIASTOLIC BLOOD PRESSURE: 76 MMHG

## 2021-06-19 DIAGNOSIS — N30.01 ACUTE CYSTITIS WITH HEMATURIA: ICD-10-CM

## 2021-06-19 DIAGNOSIS — L84 CALLUS OF FOOT: Primary | ICD-10-CM

## 2021-06-19 DIAGNOSIS — R73.9 HYPERGLYCEMIA: ICD-10-CM

## 2021-06-19 LAB
ALBUMIN SERPL BCP-MCNC: 4 G/DL (ref 3.5–5.2)
ALP SERPL-CCNC: 122 U/L (ref 55–135)
ALT SERPL W/O P-5'-P-CCNC: 13 U/L (ref 10–44)
ANION GAP SERPL CALC-SCNC: 16 MMOL/L (ref 8–16)
AST SERPL-CCNC: 18 U/L (ref 10–40)
B-OH-BUTYR BLD STRIP-SCNC: 0.3 MMOL/L (ref 0–0.5)
BACTERIA #/AREA URNS HPF: ABNORMAL /HPF
BASOPHILS # BLD AUTO: 0.06 K/UL (ref 0–0.2)
BASOPHILS NFR BLD: 0.7 % (ref 0–1.9)
BILIRUB SERPL-MCNC: 0.4 MG/DL (ref 0.1–1)
BILIRUB UR QL STRIP: NEGATIVE
BUN SERPL-MCNC: 11 MG/DL (ref 6–20)
CALCIUM SERPL-MCNC: 9.4 MG/DL (ref 8.7–10.5)
CHLORIDE SERPL-SCNC: 98 MMOL/L (ref 95–110)
CLARITY UR: ABNORMAL
CO2 SERPL-SCNC: 20 MMOL/L (ref 23–29)
COLOR UR: YELLOW
CREAT SERPL-MCNC: 1.1 MG/DL (ref 0.5–1.4)
DIFFERENTIAL METHOD: NORMAL
EOSINOPHIL # BLD AUTO: 0.3 K/UL (ref 0–0.5)
EOSINOPHIL NFR BLD: 3.6 % (ref 0–8)
ERYTHROCYTE [DISTWIDTH] IN BLOOD BY AUTOMATED COUNT: 12.3 % (ref 11.5–14.5)
EST. GFR  (AFRICAN AMERICAN): >60 ML/MIN/1.73 M^2
EST. GFR  (NON AFRICAN AMERICAN): 55 ML/MIN/1.73 M^2
GLUCOSE SERPL-MCNC: 445 MG/DL (ref 70–110)
GLUCOSE UR QL STRIP: ABNORMAL
HCT VFR BLD AUTO: 37.6 % (ref 37–48.5)
HGB BLD-MCNC: 12.6 G/DL (ref 12–16)
HGB UR QL STRIP: ABNORMAL
IMM GRANULOCYTES # BLD AUTO: 0.04 K/UL (ref 0–0.04)
IMM GRANULOCYTES NFR BLD AUTO: 0.5 % (ref 0–0.5)
KETONES UR QL STRIP: NEGATIVE
LEUKOCYTE ESTERASE UR QL STRIP: ABNORMAL
LYMPHOCYTES # BLD AUTO: 3 K/UL (ref 1–4.8)
LYMPHOCYTES NFR BLD: 34.3 % (ref 18–48)
MCH RBC QN AUTO: 28.6 PG (ref 27–31)
MCHC RBC AUTO-ENTMCNC: 33.5 G/DL (ref 32–36)
MCV RBC AUTO: 86 FL (ref 82–98)
MICROSCOPIC COMMENT: ABNORMAL
MONOCYTES # BLD AUTO: 0.4 K/UL (ref 0.3–1)
MONOCYTES NFR BLD: 4.2 % (ref 4–15)
NEUTROPHILS # BLD AUTO: 4.9 K/UL (ref 1.8–7.7)
NEUTROPHILS NFR BLD: 56.7 % (ref 38–73)
NITRITE UR QL STRIP: NEGATIVE
NRBC BLD-RTO: 0 /100 WBC
PH UR STRIP: 6 [PH] (ref 5–8)
PLATELET # BLD AUTO: 259 K/UL (ref 150–450)
PMV BLD AUTO: 12.3 FL (ref 9.2–12.9)
POCT GLUCOSE: 270 MG/DL (ref 70–110)
POCT GLUCOSE: 422 MG/DL (ref 70–110)
POTASSIUM SERPL-SCNC: 4.4 MMOL/L (ref 3.5–5.1)
PROT SERPL-MCNC: 8.1 G/DL (ref 6–8.4)
PROT UR QL STRIP: NEGATIVE
RBC # BLD AUTO: 4.4 M/UL (ref 4–5.4)
RBC #/AREA URNS HPF: 50 /HPF (ref 0–4)
SODIUM SERPL-SCNC: 134 MMOL/L (ref 136–145)
SP GR UR STRIP: >1.03 (ref 1–1.03)
SQUAMOUS #/AREA URNS HPF: 16 /HPF
URN SPEC COLLECT METH UR: ABNORMAL
UROBILINOGEN UR STRIP-ACNC: NEGATIVE EU/DL
WBC # BLD AUTO: 8.67 K/UL (ref 3.9–12.7)
WBC #/AREA URNS HPF: >100 /HPF (ref 0–5)
WBC CLUMPS URNS QL MICRO: ABNORMAL
YEAST URNS QL MICRO: ABNORMAL

## 2021-06-19 PROCEDURE — 85025 COMPLETE CBC W/AUTO DIFF WBC: CPT | Performed by: PHYSICIAN ASSISTANT

## 2021-06-19 PROCEDURE — 96374 THER/PROPH/DIAG INJ IV PUSH: CPT

## 2021-06-19 PROCEDURE — 99284 EMERGENCY DEPT VISIT MOD MDM: CPT | Mod: 25

## 2021-06-19 PROCEDURE — 82962 GLUCOSE BLOOD TEST: CPT

## 2021-06-19 PROCEDURE — 82010 KETONE BODYS QUAN: CPT | Performed by: PHYSICIAN ASSISTANT

## 2021-06-19 PROCEDURE — 80053 COMPREHEN METABOLIC PANEL: CPT | Performed by: PHYSICIAN ASSISTANT

## 2021-06-19 PROCEDURE — 96361 HYDRATE IV INFUSION ADD-ON: CPT

## 2021-06-19 PROCEDURE — 81000 URINALYSIS NONAUTO W/SCOPE: CPT | Performed by: PHYSICIAN ASSISTANT

## 2021-06-19 PROCEDURE — 63600175 PHARM REV CODE 636 W HCPCS: Performed by: PHYSICIAN ASSISTANT

## 2021-06-19 PROCEDURE — 25000003 PHARM REV CODE 250: Performed by: PHYSICIAN ASSISTANT

## 2021-06-19 PROCEDURE — 87086 URINE CULTURE/COLONY COUNT: CPT | Performed by: PHYSICIAN ASSISTANT

## 2021-06-19 RX ORDER — CEPHALEXIN 500 MG/1
500 CAPSULE ORAL EVERY 12 HOURS
Qty: 14 CAPSULE | Refills: 0 | Status: SHIPPED | OUTPATIENT
Start: 2021-06-19 | End: 2021-06-26

## 2021-06-19 RX ADMIN — INSULIN HUMAN 11 UNITS: 100 INJECTION, SOLUTION PARENTERAL at 02:06

## 2021-06-19 RX ADMIN — SODIUM CHLORIDE 500 ML: 0.9 INJECTION, SOLUTION INTRAVENOUS at 01:06

## 2021-06-20 LAB
BACTERIA UR CULT: NORMAL
BACTERIA UR CULT: NORMAL

## 2021-06-22 ENCOUNTER — TELEPHONE (OUTPATIENT)
Dept: PODIATRY | Facility: CLINIC | Age: 61
End: 2021-06-22

## 2021-06-23 ENCOUNTER — TELEPHONE (OUTPATIENT)
Dept: ORTHOPEDICS | Facility: CLINIC | Age: 61
End: 2021-06-23

## 2021-06-24 ENCOUNTER — OFFICE VISIT (OUTPATIENT)
Dept: PODIATRY | Facility: CLINIC | Age: 61
End: 2021-06-24
Payer: MEDICAID

## 2021-06-24 VITALS
HEIGHT: 66 IN | OXYGEN SATURATION: 98 % | WEIGHT: 259.69 LBS | BODY MASS INDEX: 41.73 KG/M2 | DIASTOLIC BLOOD PRESSURE: 72 MMHG | HEART RATE: 92 BPM | SYSTOLIC BLOOD PRESSURE: 118 MMHG

## 2021-06-24 DIAGNOSIS — E11.49 TYPE II DIABETES MELLITUS WITH NEUROLOGICAL MANIFESTATIONS: Primary | ICD-10-CM

## 2021-06-24 DIAGNOSIS — L84 CORN OR CALLUS: ICD-10-CM

## 2021-06-24 DIAGNOSIS — E66.01 MORBID OBESITY: ICD-10-CM

## 2021-06-24 PROCEDURE — 99214 PR OFFICE/OUTPT VISIT, EST, LEVL IV, 30-39 MIN: ICD-10-PCS | Mod: S$PBB,,, | Performed by: PODIATRIST

## 2021-06-24 PROCEDURE — 99214 OFFICE O/P EST MOD 30 MIN: CPT | Mod: S$PBB,,, | Performed by: PODIATRIST

## 2021-06-24 PROCEDURE — 99999 PR PBB SHADOW E&M-EST. PATIENT-LVL V: CPT | Mod: PBBFAC,,, | Performed by: PODIATRIST

## 2021-06-24 PROCEDURE — 99999 PR PBB SHADOW E&M-EST. PATIENT-LVL V: ICD-10-PCS | Mod: PBBFAC,,, | Performed by: PODIATRIST

## 2021-06-24 PROCEDURE — 99215 OFFICE O/P EST HI 40 MIN: CPT | Mod: PBBFAC,PN | Performed by: PODIATRIST

## 2021-06-24 RX ORDER — HYDROCHLOROTHIAZIDE 50 MG/1
50 TABLET ORAL DAILY
COMMUNITY
Start: 2021-05-26

## 2021-06-24 RX ORDER — AMITRIPTYLINE HYDROCHLORIDE 25 MG/1
25 TABLET, FILM COATED ORAL NIGHTLY
COMMUNITY
Start: 2021-03-24

## 2021-06-24 RX ORDER — CYCLOBENZAPRINE HCL 10 MG
TABLET ORAL
COMMUNITY
Start: 2021-05-26 | End: 2023-04-12 | Stop reason: ALTCHOICE

## 2021-06-24 RX ORDER — KETOROLAC TROMETHAMINE 10 MG/1
TABLET, FILM COATED ORAL
COMMUNITY
End: 2022-09-07

## 2021-06-24 RX ORDER — HYDROXYZINE PAMOATE 25 MG/1
CAPSULE ORAL
COMMUNITY
Start: 2021-01-21

## 2021-06-24 RX ORDER — INSULIN GLARGINE 100 [IU]/ML
INJECTION, SOLUTION SUBCUTANEOUS
COMMUNITY
End: 2022-09-07

## 2021-06-24 RX ORDER — HYDROCODONE BITARTRATE AND ACETAMINOPHEN 10; 325 MG/1; MG/1
TABLET ORAL
COMMUNITY
Start: 2021-02-04 | End: 2023-06-26

## 2021-06-29 ENCOUNTER — OFFICE VISIT (OUTPATIENT)
Dept: ORTHOPEDICS | Facility: CLINIC | Age: 61
End: 2021-06-29
Payer: MEDICAID

## 2021-06-29 VITALS — BODY MASS INDEX: 41.97 KG/M2 | RESPIRATION RATE: 20 BRPM | WEIGHT: 260 LBS

## 2021-06-29 DIAGNOSIS — E11.9 TYPE 2 DIABETES MELLITUS WITHOUT COMPLICATION, WITHOUT LONG-TERM CURRENT USE OF INSULIN: ICD-10-CM

## 2021-06-29 DIAGNOSIS — M17.11 PRIMARY OSTEOARTHRITIS OF RIGHT KNEE: Primary | ICD-10-CM

## 2021-06-29 PROCEDURE — 99214 OFFICE O/P EST MOD 30 MIN: CPT | Mod: PBBFAC,PN | Performed by: PHYSICIAN ASSISTANT

## 2021-06-29 PROCEDURE — 99999 PR PBB SHADOW E&M-EST. PATIENT-LVL IV: CPT | Mod: PBBFAC,,, | Performed by: PHYSICIAN ASSISTANT

## 2021-06-29 PROCEDURE — 99213 PR OFFICE/OUTPT VISIT, EST, LEVL III, 20-29 MIN: ICD-10-PCS | Mod: S$PBB,,, | Performed by: PHYSICIAN ASSISTANT

## 2021-06-29 PROCEDURE — 99999 PR PBB SHADOW E&M-EST. PATIENT-LVL IV: ICD-10-PCS | Mod: PBBFAC,,, | Performed by: PHYSICIAN ASSISTANT

## 2021-06-29 PROCEDURE — 99213 OFFICE O/P EST LOW 20 MIN: CPT | Mod: S$PBB,,, | Performed by: PHYSICIAN ASSISTANT

## 2021-06-29 RX ORDER — PEN NEEDLE, DIABETIC 31 GX5/16"
1 NEEDLE, DISPOSABLE MISCELLANEOUS 3 TIMES DAILY
COMMUNITY
Start: 2021-06-28

## 2021-07-01 ENCOUNTER — PATIENT MESSAGE (OUTPATIENT)
Dept: ADMINISTRATIVE | Facility: OTHER | Age: 61
End: 2021-07-01

## 2021-07-28 ENCOUNTER — TELEPHONE (OUTPATIENT)
Dept: ORTHOPEDICS | Facility: CLINIC | Age: 61
End: 2021-07-28

## 2021-07-29 ENCOUNTER — TELEPHONE (OUTPATIENT)
Dept: CARDIOLOGY | Facility: CLINIC | Age: 61
End: 2021-07-29

## 2021-07-29 ENCOUNTER — OFFICE VISIT (OUTPATIENT)
Dept: CARDIOLOGY | Facility: CLINIC | Age: 61
End: 2021-07-29
Payer: MEDICAID

## 2021-07-29 VITALS
HEIGHT: 66 IN | SYSTOLIC BLOOD PRESSURE: 130 MMHG | WEIGHT: 253 LBS | DIASTOLIC BLOOD PRESSURE: 80 MMHG | HEART RATE: 99 BPM | BODY MASS INDEX: 40.66 KG/M2 | OXYGEN SATURATION: 98 %

## 2021-07-29 DIAGNOSIS — I20.89 STABLE ANGINA: ICD-10-CM

## 2021-07-29 DIAGNOSIS — I10 ESSENTIAL HYPERTENSION: ICD-10-CM

## 2021-07-29 DIAGNOSIS — E78.2 MIXED HYPERLIPIDEMIA: ICD-10-CM

## 2021-07-29 DIAGNOSIS — E11.9 TYPE 2 DIABETES MELLITUS WITHOUT COMPLICATION, WITHOUT LONG-TERM CURRENT USE OF INSULIN: ICD-10-CM

## 2021-07-29 DIAGNOSIS — E66.01 CLASS 3 SEVERE OBESITY DUE TO EXCESS CALORIES WITH SERIOUS COMORBIDITY AND BODY MASS INDEX (BMI) OF 40.0 TO 44.9 IN ADULT: ICD-10-CM

## 2021-07-29 DIAGNOSIS — K21.9 GASTROESOPHAGEAL REFLUX DISEASE WITHOUT ESOPHAGITIS: ICD-10-CM

## 2021-07-29 PROBLEM — E66.813 CLASS 3 SEVERE OBESITY DUE TO EXCESS CALORIES WITH SERIOUS COMORBIDITY AND BODY MASS INDEX (BMI) OF 40.0 TO 44.9 IN ADULT: Status: ACTIVE | Noted: 2020-03-13

## 2021-07-29 PROCEDURE — 99999 PR PBB SHADOW E&M-EST. PATIENT-LVL V: ICD-10-PCS | Mod: PBBFAC,,, | Performed by: INTERNAL MEDICINE

## 2021-07-29 PROCEDURE — 99999 PR PBB SHADOW E&M-EST. PATIENT-LVL V: CPT | Mod: PBBFAC,,, | Performed by: INTERNAL MEDICINE

## 2021-07-29 PROCEDURE — 99214 OFFICE O/P EST MOD 30 MIN: CPT | Mod: S$PBB,,, | Performed by: INTERNAL MEDICINE

## 2021-07-29 PROCEDURE — 99214 PR OFFICE/OUTPT VISIT, EST, LEVL IV, 30-39 MIN: ICD-10-PCS | Mod: S$PBB,,, | Performed by: INTERNAL MEDICINE

## 2021-07-29 PROCEDURE — 99215 OFFICE O/P EST HI 40 MIN: CPT | Mod: PBBFAC,PN | Performed by: INTERNAL MEDICINE

## 2021-07-29 RX ORDER — NAPROXEN 500 MG/1
500 TABLET ORAL 2 TIMES DAILY
COMMUNITY
Start: 2021-07-20 | End: 2021-08-04

## 2021-07-29 RX ORDER — ATORVASTATIN CALCIUM 20 MG/1
20 TABLET, FILM COATED ORAL DAILY
Qty: 90 TABLET | Refills: 3 | Status: SHIPPED | OUTPATIENT
Start: 2021-07-29 | End: 2022-09-07

## 2021-07-29 RX ORDER — OLMESARTAN MEDOXOMIL 20 MG/1
20 TABLET ORAL DAILY
COMMUNITY
Start: 2021-07-15 | End: 2022-09-07

## 2021-07-29 RX ORDER — SALMETEROL XINAFOATE 50 UG/1
1 POWDER, METERED ORAL; RESPIRATORY (INHALATION) 2 TIMES DAILY
COMMUNITY
Start: 2021-07-20

## 2021-08-04 ENCOUNTER — HOSPITAL ENCOUNTER (EMERGENCY)
Facility: HOSPITAL | Age: 61
Discharge: HOME | End: 2021-08-04
Attending: EMERGENCY MEDICINE
Payer: MEDICAID

## 2021-08-04 VITALS
OXYGEN SATURATION: 99 % | DIASTOLIC BLOOD PRESSURE: 78 MMHG | RESPIRATION RATE: 16 BRPM | SYSTOLIC BLOOD PRESSURE: 168 MMHG | HEART RATE: 70 BPM | TEMPERATURE: 98 F

## 2021-08-04 DIAGNOSIS — M25.522 LEFT ELBOW PAIN: ICD-10-CM

## 2021-08-04 DIAGNOSIS — R73.9 HYPERGLYCEMIA: ICD-10-CM
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
## 2021-08-04 DIAGNOSIS — M79.669 CALF PAIN: ICD-10-CM

## 2021-08-04 LAB
ALBUMIN SERPL BCP-MCNC: 3.7 G/DL (ref 3.5–5.2)
ALP SERPL-CCNC: 105 U/L (ref 55–135)
ALT SERPL W/O P-5'-P-CCNC: 10 U/L (ref 10–44)
ANION GAP SERPL CALC-SCNC: 13 MMOL/L (ref 8–16)
AST SERPL-CCNC: 13 U/L (ref 10–40)
BACTERIA #/AREA URNS HPF: ABNORMAL /HPF
BASOPHILS # BLD AUTO: 0.04 K/UL (ref 0–0.2)
BASOPHILS NFR BLD: 0.5 % (ref 0–1.9)
BILIRUB SERPL-MCNC: 0.4 MG/DL (ref 0.1–1)
BILIRUB UR QL STRIP: NEGATIVE
BUN SERPL-MCNC: 9 MG/DL (ref 6–20)
CALCIUM SERPL-MCNC: 9.3 MG/DL (ref 8.7–10.5)
CHLORIDE SERPL-SCNC: 102 MMOL/L (ref 95–110)
CLARITY UR: CLEAR
CO2 SERPL-SCNC: 20 MMOL/L (ref 23–29)
COLOR UR: COLORLESS
CREAT SERPL-MCNC: 0.9 MG/DL (ref 0.5–1.4)
DIFFERENTIAL METHOD: ABNORMAL
EOSINOPHIL # BLD AUTO: 0.1 K/UL (ref 0–0.5)
EOSINOPHIL NFR BLD: 1.7 % (ref 0–8)
ERYTHROCYTE [DISTWIDTH] IN BLOOD BY AUTOMATED COUNT: 12.2 % (ref 11.5–14.5)
EST. GFR  (AFRICAN AMERICAN): >60 ML/MIN/1.73 M^2
EST. GFR  (NON AFRICAN AMERICAN): >60 ML/MIN/1.73 M^2
GLUCOSE SERPL-MCNC: 490 MG/DL (ref 70–110)
GLUCOSE UR QL STRIP: ABNORMAL
HCT VFR BLD AUTO: 36 % (ref 37–48.5)
HGB BLD-MCNC: 12.2 G/DL (ref 12–16)
HGB UR QL STRIP: NEGATIVE
IMM GRANULOCYTES # BLD AUTO: 0.03 K/UL (ref 0–0.04)
IMM GRANULOCYTES NFR BLD AUTO: 0.4 % (ref 0–0.5)
KETONES UR QL STRIP: NEGATIVE
LEUKOCYTE ESTERASE UR QL STRIP: ABNORMAL
LYMPHOCYTES # BLD AUTO: 2.5 K/UL (ref 1–4.8)
LYMPHOCYTES NFR BLD: 29.6 % (ref 18–48)
MCH RBC QN AUTO: 29.8 PG (ref 27–31)
MCHC RBC AUTO-ENTMCNC: 33.9 G/DL (ref 32–36)
MCV RBC AUTO: 88 FL (ref 82–98)
MICROSCOPIC COMMENT: ABNORMAL
MONOCYTES # BLD AUTO: 0.4 K/UL (ref 0.3–1)
MONOCYTES NFR BLD: 4.7 % (ref 4–15)
NEUTROPHILS # BLD AUTO: 5.3 K/UL (ref 1.8–7.7)
NEUTROPHILS NFR BLD: 63.1 % (ref 38–73)
NITRITE UR QL STRIP: NEGATIVE
NRBC BLD-RTO: 0 /100 WBC
PH UR STRIP: 5 [PH] (ref 5–8)
PLATELET # BLD AUTO: 237 K/UL (ref 150–450)
PMV BLD AUTO: 11.4 FL (ref 9.2–12.9)
POCT GLUCOSE: 345 MG/DL (ref 70–110)
POCT GLUCOSE: 423 MG/DL (ref 70–110)
POTASSIUM SERPL-SCNC: 4.1 MMOL/L (ref 3.5–5.1)
PROT SERPL-MCNC: 7.7 G/DL (ref 6–8.4)
PROT UR QL STRIP: NEGATIVE
RBC # BLD AUTO: 4.1 M/UL (ref 4–5.4)
SODIUM SERPL-SCNC: 135 MMOL/L (ref 136–145)
SP GR UR STRIP: >1.03 (ref 1–1.03)
SQUAMOUS #/AREA URNS HPF: 2 /HPF
URN SPEC COLLECT METH UR: ABNORMAL
UROBILINOGEN UR STRIP-ACNC: NEGATIVE EU/DL
WBC # BLD AUTO: 8.31 K/UL (ref 3.9–12.7)
WBC #/AREA URNS HPF: 10 /HPF (ref 0–5)
YEAST URNS QL MICRO: ABNORMAL

## 2021-08-04 PROCEDURE — 63600175 PHARM REV CODE 636 W HCPCS: Performed by: PHYSICIAN ASSISTANT

## 2021-08-04 PROCEDURE — 96361 HYDRATE IV INFUSION ADD-ON: CPT

## 2021-08-04 PROCEDURE — 63600175 PHARM REV CODE 636 W HCPCS: Performed by: EMERGENCY MEDICINE

## 2021-08-04 PROCEDURE — 80053 COMPREHEN METABOLIC PANEL: CPT | Performed by: PHYSICIAN ASSISTANT

## 2021-08-04 PROCEDURE — 25000003 PHARM REV CODE 250: Performed by: PHYSICIAN ASSISTANT

## 2021-08-04 PROCEDURE — 99285 EMERGENCY DEPT VISIT HI MDM: CPT | Mod: 25

## 2021-08-04 PROCEDURE — 85025 COMPLETE CBC W/AUTO DIFF WBC: CPT | Performed by: PHYSICIAN ASSISTANT

## 2021-08-04 PROCEDURE — 81000 URINALYSIS NONAUTO W/SCOPE: CPT | Performed by: PHYSICIAN ASSISTANT

## 2021-08-04 PROCEDURE — 96374 THER/PROPH/DIAG INJ IV PUSH: CPT

## 2021-08-04 PROCEDURE — 82962 GLUCOSE BLOOD TEST: CPT

## 2021-08-04 PROCEDURE — 96375 TX/PRO/DX INJ NEW DRUG ADDON: CPT

## 2021-08-04 RX ORDER — KETOROLAC TROMETHAMINE 30 MG/ML
15 INJECTION, SOLUTION INTRAMUSCULAR; INTRAVENOUS
Status: COMPLETED | OUTPATIENT
Start: 2021-08-04 | End: 2021-08-04

## 2021-08-04 RX ORDER — LIDOCAINE 50 MG/G
1 PATCH TOPICAL DAILY
Qty: 15 PATCH | Refills: 0 | Status: SHIPPED | OUTPATIENT
Start: 2021-08-04 | End: 2022-09-07

## 2021-08-04 RX ORDER — KETOROLAC TROMETHAMINE 30 MG/ML
30 INJECTION, SOLUTION INTRAMUSCULAR; INTRAVENOUS
Status: DISCONTINUED | OUTPATIENT
Start: 2021-08-04 | End: 2021-08-04

## 2021-08-04 RX ORDER — NAPROXEN 500 MG/1
500 TABLET ORAL 2 TIMES DAILY WITH MEALS
Qty: 14 TABLET | Refills: 0 | Status: SHIPPED | OUTPATIENT
Start: 2021-08-04 | End: 2022-09-07

## 2021-08-04 RX ORDER — ACETAMINOPHEN 500 MG
1000 TABLET ORAL
Status: COMPLETED | OUTPATIENT
Start: 2021-08-04 | End: 2021-08-04

## 2021-08-04 RX ORDER — METHOCARBAMOL 750 MG/1
1500 TABLET, FILM COATED ORAL 3 TIMES DAILY
Qty: 30 TABLET | Refills: 0 | Status: SHIPPED | OUTPATIENT
Start: 2021-08-04 | End: 2021-08-09

## 2021-08-04 RX ADMIN — SODIUM CHLORIDE 1000 ML: 0.9 INJECTION, SOLUTION INTRAVENOUS at 02:08

## 2021-08-04 RX ADMIN — KETOROLAC TROMETHAMINE 15 MG: 30 INJECTION, SOLUTION INTRAMUSCULAR; INTRAVENOUS at 01:08

## 2021-08-04 RX ADMIN — ACETAMINOPHEN 1000 MG: 500 TABLET ORAL at 01:08

## 2021-08-04 RX ADMIN — INSULIN HUMAN 7 UNITS: 100 INJECTION, SOLUTION PARENTERAL at 02:08

## 2021-08-04 RX ADMIN — SODIUM CHLORIDE 1000 ML: 0.9 INJECTION, SOLUTION INTRAVENOUS at 01:08

## 2021-08-27 ENCOUNTER — TELEPHONE (OUTPATIENT)
Dept: CARDIOLOGY | Facility: CLINIC | Age: 61
End: 2021-08-27

## 2021-09-15 ENCOUNTER — TELEPHONE (OUTPATIENT)
Dept: CARDIOLOGY | Facility: CLINIC | Age: 61
End: 2021-09-15

## 2021-10-15 ENCOUNTER — HOSPITAL ENCOUNTER (EMERGENCY)
Facility: HOSPITAL | Age: 61
Discharge: HOME | End: 2021-10-16
Attending: EMERGENCY MEDICINE
Payer: MEDICAID

## 2021-10-15 DIAGNOSIS — L03.032 PARONYCHIA OF GREAT TOE, LEFT: ICD-10-CM

## 2021-10-15 DIAGNOSIS — R73.9 HYPERGLYCEMIA: Primary | ICD-10-CM

## 2021-10-15 LAB — POCT GLUCOSE: 394 MG/DL (ref 70–110)

## 2021-10-15 PROCEDURE — 85025 COMPLETE CBC W/AUTO DIFF WBC: CPT | Performed by: EMERGENCY MEDICINE

## 2021-10-15 PROCEDURE — 80053 COMPREHEN METABOLIC PANEL: CPT | Performed by: EMERGENCY MEDICINE

## 2021-10-15 PROCEDURE — 82962 GLUCOSE BLOOD TEST: CPT

## 2021-10-15 PROCEDURE — 99284 EMERGENCY DEPT VISIT MOD MDM: CPT | Mod: 25

## 2021-10-15 PROCEDURE — 25000003 PHARM REV CODE 250: Performed by: EMERGENCY MEDICINE

## 2021-10-15 PROCEDURE — 96361 HYDRATE IV INFUSION ADD-ON: CPT

## 2021-10-15 RX ORDER — CEPHALEXIN 500 MG/1
500 CAPSULE ORAL
Status: COMPLETED | OUTPATIENT
Start: 2021-10-15 | End: 2021-10-15

## 2021-10-15 RX ADMIN — SODIUM CHLORIDE 1000 ML: 0.9 INJECTION, SOLUTION INTRAVENOUS at 11:10

## 2021-10-15 RX ADMIN — CEPHALEXIN 500 MG: 500 CAPSULE ORAL at 11:10

## 2021-10-16 VITALS
HEART RATE: 82 BPM | RESPIRATION RATE: 18 BRPM | WEIGHT: 251 LBS | BODY MASS INDEX: 40.51 KG/M2 | OXYGEN SATURATION: 100 % | TEMPERATURE: 98 F | SYSTOLIC BLOOD PRESSURE: 162 MMHG | DIASTOLIC BLOOD PRESSURE: 77 MMHG

## 2021-10-16 LAB
ALBUMIN SERPL BCP-MCNC: 3.9 G/DL (ref 3.5–5.2)
ALLENS TEST: ABNORMAL
ALP SERPL-CCNC: 112 U/L (ref 55–135)
ALT SERPL W/O P-5'-P-CCNC: 11 U/L (ref 10–44)
ANION GAP SERPL CALC-SCNC: 12 MMOL/L (ref 8–16)
AST SERPL-CCNC: 11 U/L (ref 10–40)
BASOPHILS # BLD AUTO: 0.03 K/UL (ref 0–0.2)
BASOPHILS NFR BLD: 0.3 % (ref 0–1.9)
BILIRUB SERPL-MCNC: 0.7 MG/DL (ref 0.1–1)
BUN SERPL-MCNC: 8 MG/DL (ref 6–20)
CALCIUM SERPL-MCNC: 9.9 MG/DL (ref 8.7–10.5)
CHLORIDE SERPL-SCNC: 99 MMOL/L (ref 95–110)
CO2 SERPL-SCNC: 25 MMOL/L (ref 23–29)
CREAT SERPL-MCNC: 0.9 MG/DL (ref 0.5–1.4)
DIFFERENTIAL METHOD: NORMAL
EOSINOPHIL # BLD AUTO: 0.1 K/UL (ref 0–0.5)
EOSINOPHIL NFR BLD: 0.9 % (ref 0–8)
ERYTHROCYTE [DISTWIDTH] IN BLOOD BY AUTOMATED COUNT: 12.2 % (ref 11.5–14.5)
EST. GFR  (AFRICAN AMERICAN): >60 ML/MIN/1.73 M^2
EST. GFR  (NON AFRICAN AMERICAN): >60 ML/MIN/1.73 M^2
GLUCOSE SERPL-MCNC: 393 MG/DL (ref 70–110)
HCO3 UR-SCNC: 31.3 MMOL/L (ref 24–28)
HCT VFR BLD AUTO: 37.8 % (ref 37–48.5)
HGB BLD-MCNC: 12.7 G/DL (ref 12–16)
IMM GRANULOCYTES # BLD AUTO: 0.03 K/UL (ref 0–0.04)
IMM GRANULOCYTES NFR BLD AUTO: 0.3 % (ref 0–0.5)
LYMPHOCYTES # BLD AUTO: 2.6 K/UL (ref 1–4.8)
LYMPHOCYTES NFR BLD: 28.4 % (ref 18–48)
MCH RBC QN AUTO: 28.7 PG (ref 27–31)
MCHC RBC AUTO-ENTMCNC: 33.6 G/DL (ref 32–36)
MCV RBC AUTO: 86 FL (ref 82–98)
MONOCYTES # BLD AUTO: 0.4 K/UL (ref 0.3–1)
MONOCYTES NFR BLD: 4.1 % (ref 4–15)
NEUTROPHILS # BLD AUTO: 6.1 K/UL (ref 1.8–7.7)
NEUTROPHILS NFR BLD: 66 % (ref 38–73)
NRBC BLD-RTO: 0 /100 WBC
PCO2 BLDA: 54.4 MMHG (ref 35–45)
PH SMN: 7.37 [PH] (ref 7.35–7.45)
PLATELET # BLD AUTO: 225 K/UL (ref 150–450)
PMV BLD AUTO: 11.1 FL (ref 9.2–12.9)
PO2 BLDA: 27 MMHG (ref 40–60)
POC BE: 6 MMOL/L
POC SATURATED O2: 46 % (ref 95–100)
POC TCO2: 33 MMOL/L (ref 24–29)
POCT GLUCOSE: 277 MG/DL (ref 70–110)
POCT GLUCOSE: 337 MG/DL (ref 70–110)
POTASSIUM SERPL-SCNC: 3.5 MMOL/L (ref 3.5–5.1)
PROT SERPL-MCNC: 7.7 G/DL (ref 6–8.4)
RBC # BLD AUTO: 4.42 M/UL (ref 4–5.4)
SAMPLE: ABNORMAL
SITE: ABNORMAL
SODIUM SERPL-SCNC: 136 MMOL/L (ref 136–145)
WBC # BLD AUTO: 9.24 K/UL (ref 3.9–12.7)

## 2021-10-16 PROCEDURE — 63600175 PHARM REV CODE 636 W HCPCS: Performed by: EMERGENCY MEDICINE

## 2021-10-16 PROCEDURE — 96374 THER/PROPH/DIAG INJ IV PUSH: CPT

## 2021-10-16 PROCEDURE — 99900035 HC TECH TIME PER 15 MIN (STAT)

## 2021-10-16 PROCEDURE — 82962 GLUCOSE BLOOD TEST: CPT

## 2021-10-16 PROCEDURE — 82803 BLOOD GASES ANY COMBINATION: CPT

## 2021-10-16 RX ORDER — CEPHALEXIN 500 MG/1
500 CAPSULE ORAL EVERY 8 HOURS
Qty: 21 CAPSULE | Refills: 0 | Status: SHIPPED | OUTPATIENT
Start: 2021-10-16 | End: 2021-10-23

## 2021-10-16 RX ORDER — MUPIROCIN 20 MG/G
OINTMENT TOPICAL 3 TIMES DAILY
Qty: 15 G | Refills: 0 | Status: SHIPPED | OUTPATIENT
Start: 2021-10-16

## 2021-10-16 RX ADMIN — INSULIN HUMAN 6 UNITS: 100 INJECTION, SOLUTION PARENTERAL at 12:10

## 2021-11-11 ENCOUNTER — TELEPHONE (OUTPATIENT)
Dept: CARDIOLOGY | Facility: CLINIC | Age: 61
End: 2021-11-11
Payer: MEDICAID

## 2021-11-12 ENCOUNTER — TELEPHONE (OUTPATIENT)
Dept: CARDIOLOGY | Facility: CLINIC | Age: 61
End: 2021-11-12
Payer: MEDICAID

## 2021-11-22 ENCOUNTER — TELEPHONE (OUTPATIENT)
Dept: CARDIOLOGY | Facility: CLINIC | Age: 61
End: 2021-11-22
Payer: MEDICAID

## 2022-05-04 DIAGNOSIS — M25.569 KNEE PAIN, UNSPECIFIED CHRONICITY, UNSPECIFIED LATERALITY: Primary | ICD-10-CM

## 2022-05-06 ENCOUNTER — TELEPHONE (OUTPATIENT)
Dept: ORTHOPEDICS | Facility: CLINIC | Age: 62
End: 2022-05-06
Payer: MEDICAID

## 2022-05-06 NOTE — TELEPHONE ENCOUNTER
LVM  Informing pt she can come up for her visit first to be seen then we will have the xray completed  Afterwards. Left contact number is any questions.

## 2022-08-07 ENCOUNTER — NURSE TRIAGE (OUTPATIENT)
Dept: ADMINISTRATIVE | Facility: CLINIC | Age: 62
End: 2022-08-07
Payer: MEDICAID

## 2022-08-07 NOTE — TELEPHONE ENCOUNTER
Reason for Disposition   [1] Constant abdominal pain AND [2] present > 2 hours    Additional Information   Negative: [1] Abdomen pain is main symptom AND [2] male   Negative: [1] Abdomen pain is main symptom AND [2] adult female   Negative: Rectal bleeding or blood in stool is main symptom   Negative: Rectal pain or itching is main symptom   Negative: Constipation in a cancer patient who is currently (or recently) receiving chemotherapy or radiation therapy, or cancer patient who has metastatic or end-stage cancer and is receiving palliative care   Negative: Patient sounds very sick or weak to the triager   Negative: [1] Vomiting AND [2] abdomen looks much more swollen than usual   Negative: [1] Vomiting AND [2] contains bile (green color)    Protocols used: CONSTIPATION-A-AH  pt called re constipation. ate prunes. Pt reports her bottom is sore from straining - not able to pass BM. last BM earlier this am was hard and come out in pieces. ate prunes.  Pt states the bottom of her stomach also hurts. no Vomiting, + flatus. rates pain 10. rec ED. Pt agrees. Call back with questions

## 2022-09-06 NOTE — PROGRESS NOTES
"Subjective:      Patient ID: Mame Kumari is a 61 y.o. female.    Chief Complaint: No chief complaint on file.      HPI  (Anthony)    She was scheduled for right TKA by Dr. Cruz on 5/10/21 and this was cancelled when her HgbA1c was 10.5.     She was to follow up with PCP regarding her blood sugar control.     She is here for follow up.     Last seen by me on 6/29/21.     She is doing well. Has lost 20 pounds since I saw her last year! Blood sugars have been better controlled. PCP changed her medications around.     She continues with constant right > left knee pain that is worse with standing and walking. Had to take stairs this morning as elevator was broken and pain is a 10 on a scale of 1-10. Pain is aching in nature.     She is taking motrin. Lost everything in Su including her knee braces.       Past Medical History:   Diagnosis Date    Arthritis     Asthma     CHF (congestive heart failure)     Diabetes mellitus     GERD (gastroesophageal reflux disease)     Hypertension     Neuropathy          Current Outpatient Medications:     albuterol (ACCUNEB) 0.63 mg/3 mL Nebu, Take 3 mLs (0.63 mg total) by nebulization every 6 (six) hours as needed., Disp: 100 vial, Rfl: 3    amitriptyline (ELAVIL) 25 MG tablet, Take 25 mg by mouth nightly., Disp: , Rfl:     atorvastatin (LIPITOR) 20 MG tablet, Take 1 tablet (20 mg total) by mouth once daily., Disp: 90 tablet, Rfl: 3    BD ULTRA-FINE SHORT PEN NEEDLE 31 gauge x 5/16" Ndle, Inject 1 each into the skin 3 (three) times daily., Disp: , Rfl:     cyclobenzaprine (FLEXERIL) 10 MG tablet, , Disp: , Rfl:     furosemide (LASIX) 40 MG tablet, Take 40 mg by mouth once daily. , Disp: , Rfl:     hydroCHLOROthiazide (HYDRODIURIL) 50 MG tablet, Take 50 mg by mouth once daily., Disp: , Rfl:     HYDROcodone-acetaminophen (NORCO)  mg per tablet, , Disp: , Rfl:     hydrOXYzine pamoate (VISTARIL) 25 MG Cap, 1, Disp: , Rfl:     ibuprofen (ADVIL,MOTRIN) 600 MG tablet, Take 1 " "tablet (600 mg total) by mouth every 6 (six) hours as needed for Pain., Disp: 20 tablet, Rfl: 0    mupirocin (BACTROBAN) 2 % ointment, Apply topically 3 (three) times daily., Disp: 15 g, Rfl: 0    nitroGLYCERIN (NITROSTAT) 0.4 MG SL tablet, , Disp: , Rfl:     NOVOFINE 32 32 x 1/4 " Ndle, , Disp: , Rfl:     pantoprazole (PROTONIX) 40 MG tablet, pantoprazole 40 mg tablet,delayed release, Disp: , Rfl:     promethazine (PHENERGAN) 25 MG tablet, promethazine 25 mg tablet, Disp: , Rfl:     SEREVENT DISKUS 50 mcg/dose diskus inhaler, Inhale 1 puff into the lungs 2 (two) times daily., Disp: , Rfl:     timolol maleate 0.5% (TIMOPTIC) 0.5 % Drop, timolol maleate 0.5 % eye drops, Disp: , Rfl:     tiZANidine (ZANAFLEX) 4 MG tablet, Take 1 tablet (4 mg total) by mouth every 6 (six) hours as needed (muscle pain/spasms)., Disp: 20 tablet, Rfl: 0    traMADoL (ULTRAM) 50 mg tablet, Take 1 tablet (50 mg total) by mouth every 6 (six) hours as needed for Pain., Disp: 8 tablet, Rfl: 0    TRUE METRIX GLUCOSE TEST STRIP Strp, , Disp: , Rfl:     ULTICARE PEN NEEDLE 31 gauge x 1/4" Ndle, , Disp: , Rfl:     VENTOLIN HFA 90 mcg/actuation inhaler, , Disp: , Rfl:     OZEMPIC 0.25 mg or 0.5 mg(2 mg/1.5 mL) pen injector, , Disp: , Rfl:     quetiapine (SEROQUEL) 100 MG Tab, Take 100 mg by mouth every evening. , Disp: , Rfl:     quetiapine (SEROQUEL) 200 MG Tab, TK 1 T PO QHS, Disp: , Rfl: 5    Review of patient's allergies indicates:   Allergen Reactions    Aloe vera Itching    Gabapentin     Hydrochlorothiazide     Lantus [insulin glargine] Swelling     Face swelling and itching.    Metformin Other (See Comments)    Penicillins Rash    Sitagliptin Nausea Only       Review of Systems   Constitutional: Negative for chills, fever, night sweats and weight gain.   Gastrointestinal:  Negative for bowel incontinence, nausea and vomiting.   Genitourinary:  Negative for bladder incontinence.   Neurological:  Negative for disturbances in coordination and " loss of balance.       Objective:        Wt 108.8 kg (239 lb 14.4 oz)   LMP 10/03/2013   BMI 38.72 kg/m²     Ortho/SPM Exam    Body habitus is obese.  The patient walks with a limp.     Right knee exam:  Hip irritability  negative.   The skin over the knee is intact.  Knee effusion minimal   Palpation- tender medially  Range of motion- Flexion 115 deg, Extension 0 deg,      Ligament laxity exam:   MCL 1+   Lachman 0   Post sag  0    LCL 0     Patellar apprehension negative.  Popliteal cyst negative  Patellar crepitation present.  Meniscal irritability not applicable     Motor normal 5/5 strength in all tested muscle groups.   Sensory normal.    Left knee exam:  Hip irritability  negative.   The skin over the knee is intact.  Knee effusion minimal   Palpation- tender medially  Range of motion- Flexion 120 deg, Extension 0 deg,      Ligament laxity exam:   MCL 1+   Lachman 0   Post sag  0    LCL 0     Patellar apprehension negative.  Popliteal cyst negative  Patellar crepitation present.  Meniscal irritability not applicable     Motor normal 5/5 strength in all tested muscle groups.   Sensory normal.      XRAY INTERPRETATION:   X-rays of bilateral knees dated 9/7/22 are personally reviewed and show moderate medial joint narrowing both knees with patellar spurring.         Assessment:       Encounter Diagnoses   Name Primary?    Primary osteoarthritis of right knee Yes    Primary osteoarthritis of left knee     Type 2 diabetes mellitus without complication, without long-term current use of insulin             Plan:       Diagnoses and all orders for this visit:    Primary osteoarthritis of right knee    Primary osteoarthritis of left knee    Type 2 diabetes mellitus without complication, without long-term current use of insulin  -     Hemoglobin A1C; Future      She continues with constant right > left knee pain that is worse with standing and walking.     Known moderate medial joint narrowing both knees with  patellar spurring.    Treatment options reviewed with patient along with above bilateral knee XRs. Following plan made:     - Check HgbA1c today. Will call her with results.   - Continue with weight loss- she is doing great!  - Given hinged knee sleeve to wear prn prolonged standing and walking.   - Will have her follow up with Dr. Cruz to discuss surgical options. She still wants to to right TKA.     Follow up if symptoms worsen or fail to improve.         ADDENDUM 9/7/22:   HgbA1c is 12.8. Patient called with results. Can keep f/u with Anthony, but reminded her that HgbA1c would need to be less than 8 to proceed with surgery.

## 2022-09-07 ENCOUNTER — TELEPHONE (OUTPATIENT)
Dept: ORTHOPEDICS | Facility: CLINIC | Age: 62
End: 2022-09-07
Payer: MEDICAID

## 2022-09-07 ENCOUNTER — OFFICE VISIT (OUTPATIENT)
Dept: ORTHOPEDICS | Facility: CLINIC | Age: 62
End: 2022-09-07
Payer: MEDICAID

## 2022-09-07 VITALS — WEIGHT: 239.88 LBS | BODY MASS INDEX: 38.72 KG/M2

## 2022-09-07 DIAGNOSIS — E11.9 TYPE 2 DIABETES MELLITUS WITHOUT COMPLICATION, WITHOUT LONG-TERM CURRENT USE OF INSULIN: ICD-10-CM

## 2022-09-07 DIAGNOSIS — M17.12 PRIMARY OSTEOARTHRITIS OF LEFT KNEE: ICD-10-CM

## 2022-09-07 DIAGNOSIS — M17.11 PRIMARY OSTEOARTHRITIS OF RIGHT KNEE: Primary | ICD-10-CM

## 2022-09-07 PROCEDURE — 99213 PR OFFICE/OUTPT VISIT, EST, LEVL III, 20-29 MIN: ICD-10-PCS | Mod: S$PBB,,, | Performed by: PHYSICIAN ASSISTANT

## 2022-09-07 PROCEDURE — 4010F PR ACE/ARB THEARPY RXD/TAKEN: ICD-10-PCS | Mod: CPTII,,, | Performed by: PHYSICIAN ASSISTANT

## 2022-09-07 PROCEDURE — 1159F MED LIST DOCD IN RCRD: CPT | Mod: CPTII,,, | Performed by: PHYSICIAN ASSISTANT

## 2022-09-07 PROCEDURE — 1159F PR MEDICATION LIST DOCUMENTED IN MEDICAL RECORD: ICD-10-PCS | Mod: CPTII,,, | Performed by: PHYSICIAN ASSISTANT

## 2022-09-07 PROCEDURE — 3008F BODY MASS INDEX DOCD: CPT | Mod: CPTII,,, | Performed by: PHYSICIAN ASSISTANT

## 2022-09-07 PROCEDURE — 99999 PR PBB SHADOW E&M-EST. PATIENT-LVL III: CPT | Mod: PBBFAC,,, | Performed by: PHYSICIAN ASSISTANT

## 2022-09-07 PROCEDURE — 99213 OFFICE O/P EST LOW 20 MIN: CPT | Mod: PBBFAC,PN | Performed by: PHYSICIAN ASSISTANT

## 2022-09-07 PROCEDURE — 1160F RVW MEDS BY RX/DR IN RCRD: CPT | Mod: CPTII,,, | Performed by: PHYSICIAN ASSISTANT

## 2022-09-07 PROCEDURE — 99999 PR PBB SHADOW E&M-EST. PATIENT-LVL III: ICD-10-PCS | Mod: PBBFAC,,, | Performed by: PHYSICIAN ASSISTANT

## 2022-09-07 PROCEDURE — 1160F PR REVIEW ALL MEDS BY PRESCRIBER/CLIN PHARMACIST DOCUMENTED: ICD-10-PCS | Mod: CPTII,,, | Performed by: PHYSICIAN ASSISTANT

## 2022-09-07 PROCEDURE — 4010F ACE/ARB THERAPY RXD/TAKEN: CPT | Mod: CPTII,,, | Performed by: PHYSICIAN ASSISTANT

## 2022-09-07 PROCEDURE — 99213 OFFICE O/P EST LOW 20 MIN: CPT | Mod: S$PBB,,, | Performed by: PHYSICIAN ASSISTANT

## 2022-09-07 PROCEDURE — 3008F PR BODY MASS INDEX (BMI) DOCUMENTED: ICD-10-PCS | Mod: CPTII,,, | Performed by: PHYSICIAN ASSISTANT

## 2022-09-07 RX ORDER — SEMAGLUTIDE 1.34 MG/ML
INJECTION, SOLUTION SUBCUTANEOUS
COMMUNITY
Start: 2022-08-01

## 2022-09-07 NOTE — TELEPHONE ENCOUNTER
Spoke with Mrs. Kumari, informed of results and PA recommendations. Appointment scheduled with MD . Patient expressed v/u.

## 2022-09-07 NOTE — TELEPHONE ENCOUNTER
----- Message from Connie Toney PA-C sent at 9/7/2022 11:56 AM CDT -----  Please call and let her know that her hemoglobin A1c labs was 12.8 from this morning. This number would need to be below 8 for her to have knee surgery. She should keep f/u as scheduled with Dr. Cruz.     Thanks.

## 2022-09-16 ENCOUNTER — OUTSIDE PLACE OF SERVICE (OUTPATIENT)
Dept: CARDIOLOGY | Facility: CLINIC | Age: 62
End: 2022-09-16
Payer: MEDICAID

## 2022-09-16 PROCEDURE — 93010 PR ELECTROCARDIOGRAM REPORT: ICD-10-PCS | Mod: ,,, | Performed by: INTERNAL MEDICINE

## 2022-09-16 PROCEDURE — 93010 ELECTROCARDIOGRAM REPORT: CPT | Mod: ,,, | Performed by: INTERNAL MEDICINE

## 2022-09-27 NOTE — ED NOTES
Ace wrap applied to right knee   
Pt states has orthopedic appt Monday for this complaint.   
[FreeTextEntry1] : 38 yo , presents for follow up regarding stomach bloating, frequent urination, vaginal sxs. She reports that since giving birth, she has been experiencing vaginal irritation, spotting. Pt describes the spotting as pink in color. She has associated sxs of abdominal bloating. Pt reports that after she urinates, she soon feels that she has to urinate again, and never feels that her bladder is empty. Pt has been going on light walks as exercise but denies any heavy core exercises. She saw PCP for her abnormal bowel movements, was recommended that she avoid dairy in her diet due to suspected IBS. She also saw GI, reportedly normal. Pt saw nutritionist, does report that she does not have a healthy diet. 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 Pt has an IUD currently in place, had it placed within the last 6 months. \par \par ObHx: C/S 12/10/2020- female 5.14, PEC \par PMHx: Chronic HTN

## 2023-04-12 ENCOUNTER — HOSPITAL ENCOUNTER (EMERGENCY)
Facility: HOSPITAL | Age: 63
Discharge: HOME | End: 2023-04-12
Attending: EMERGENCY MEDICINE
Payer: MEDICAID

## 2023-04-12 VITALS
DIASTOLIC BLOOD PRESSURE: 84 MMHG | OXYGEN SATURATION: 98 % | TEMPERATURE: 98 F | HEART RATE: 85 BPM | RESPIRATION RATE: 19 BRPM | SYSTOLIC BLOOD PRESSURE: 144 MMHG

## 2023-04-12 DIAGNOSIS — R07.81 RIB PAIN ON LEFT SIDE: ICD-10-CM

## 2023-04-12 DIAGNOSIS — S00.03XA CONTUSION OF SCALP, INITIAL ENCOUNTER: Primary | ICD-10-CM
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
## 2023-04-12 PROCEDURE — 99284 EMERGENCY DEPT VISIT MOD MDM: CPT | Mod: 25

## 2023-04-12 PROCEDURE — 25000003 PHARM REV CODE 250: Performed by: PHYSICIAN ASSISTANT

## 2023-04-12 RX ORDER — METHOCARBAMOL 500 MG/1
500 TABLET, FILM COATED ORAL 3 TIMES DAILY
Qty: 15 TABLET | Refills: 0 | Status: SHIPPED | OUTPATIENT
Start: 2023-04-12 | End: 2023-04-17

## 2023-04-12 RX ORDER — LIDOCAINE 50 MG/G
1 PATCH TOPICAL DAILY
Qty: 5 PATCH | Refills: 0 | Status: SHIPPED | OUTPATIENT
Start: 2023-04-12

## 2023-04-12 RX ORDER — LIDOCAINE 50 MG/G
1 PATCH TOPICAL ONCE
Status: DISCONTINUED | OUTPATIENT
Start: 2023-04-12 | End: 2023-04-12 | Stop reason: HOSPADM

## 2023-04-12 RX ADMIN — LIDOCAINE 1 PATCH: 50 PATCH TOPICAL at 09:04

## 2023-04-12 NOTE — ED PROVIDER NOTES
"Encounter Date: 4/12/2023       History     Chief Complaint   Patient presents with    Fall     Pt presents to ED today reports fall on Monday while walking out of clinic  Was seen at [facility] ER received CT scan of head and abd with no findings pt c/o pain all over body unrelieved by OTC tylenol and ibuprofen   Denies taking medication this am      62-year-old female presents to ED with concern of continued pain after fall that occurred 2 days ago.  Patient reports she was walking to her clinic when she tripped over uneven concrete, causing her to fall forward and contused face.  No LOC. No use of blood thinners.  EMS was contacted and she was sent to [facility] where she reports she had CT scan of her head, neck and abdomen with no normal findings.  She is continued taking Tylenol/ibuprofen with no reported improvement.  She continues have generalized headache and states her left eye "feels blurry".  No eye pain. No flashing of lights or floaters. She has had intermittent nausea but no vomiting.  She also reports continued pain throughout neck and left-sided rib cage.  No cough, shortness of breath, abdominal pain, urinary or bowel complaints, numbness, focal weakness.  No other acute complaints at this time.    The history is provided by the patient.   Review of patient's allergies indicates:   Allergen Reactions    Aloe vera Itching    Gabapentin     Hydrochlorothiazide     Lantus [insulin glargine] Swelling     Face swelling and itching.    Metformin Other (See Comments)    Penicillins Rash    Sitagliptin Nausea Only     Past Medical History:   Diagnosis Date    Arthritis     Asthma     CHF (congestive heart failure)     Diabetes mellitus     GERD (gastroesophageal reflux disease)     Hypertension     Neuropathy      Past Surgical History:   Procedure Laterality Date    ARTHROSCOPIC CHONDROPLASTY OF KNEE JOINT  8/13/2018    Procedure: ARTHROSCOPY, KNEE, WITH CHONDROPLASTY;  Surgeon: Jairo Cruz MD;  " Location: [facility] OR;  Service: Orthopedics;;    HAND SURGERY Left     KNEE ARTHROSCOPY W/ MENISCECTOMY Right 8/13/2018    Procedure: ARTHROSCOPY, KNEE, WITH MENISCECTOMY;  Surgeon: Jairo Cruz MD;  Location: [facility] OR;  Service: Orthopedics;  Laterality: Right;    TUBAL LIGATION       Family History   Problem Relation Age of Onset    Cancer Mother     Cancer Father     Breast cancer Maternal Grandmother      Social History     Tobacco Use    Smoking status: Never    Smokeless tobacco: Never    Tobacco comments:     Stay smoke free.   Substance Use Topics    Alcohol use: No    Drug use: No     Review of Systems   Constitutional:  Negative for chills and fever.   HENT:  Negative for sore throat.    Eyes:  Positive for visual disturbance. Negative for photophobia, pain and redness.   Respiratory:  Negative for cough and shortness of breath.    Cardiovascular:  Negative for chest pain.        Left rib pain   Gastrointestinal:  Positive for nausea. Negative for abdominal pain, diarrhea and vomiting.   Genitourinary:  Negative for dysuria.   Musculoskeletal:  Positive for neck pain. Negative for back pain and neck stiffness.   Skin:  Positive for wound.   Neurological:  Positive for headaches. Negative for dizziness, syncope, weakness, light-headedness and numbness.     Physical Exam     Initial Vitals [04/12/23 0849]   BP Pulse Resp Temp SpO2   (!) 144/84 85 19 98.2 °F (36.8 °C) 98 %      MAP       --         Physical Exam    Nursing note and vitals reviewed.  Constitutional: She appears well-developed and well-nourished. She is Obese . She is cooperative. She does not have a sickly appearance. She does not appear ill. No distress.   HENT:   Head: Normocephalic.   Right Ear: Tympanic membrane normal.   Left Ear: Tympanic membrane normal.   Nose: Nose normal.   Mouth/Throat: Uvula is midline and oropharynx is clear and moist.   Contusion site to center of frontal scalp.  Small superficial abrasion.  No palpable hematoma  or other palpable defects.   Eyes: EOM are normal.   No pain with EOM. PERRL   Neck: Neck supple.   Bilateral cervical paraspinal muscle tenderness extending towards bilateral trapezius.  Denying midline bony tenderness with no bony palpable step-offs.  Full ROM.  Sensations intact into BUE.   Normal range of motion.  Cardiovascular:  Normal rate, regular rhythm and normal heart sounds.           Pulmonary/Chest: Effort normal and breath sounds normal.   Left-sided rib pain over lateral/distal rib cage.  No physical or palpable deformities.  No overlying skin changes or bruising.  Lungs clear bilaterally.   Abdominal: Abdomen is soft. There is no abdominal tenderness.   Musculoskeletal:         General: No tenderness.      Cervical back: Normal range of motion and neck supple.     Neurological: She is alert and oriented to person, place, and time. She is not disoriented. GCS eye subscore is 4. GCS verbal subscore is 5. GCS motor subscore is 6.   Skin: Skin is warm and dry.   Superficial abrasions to bilateral palms of hands.  Healing well.   Psychiatric: She has a normal mood and affect. Her speech is normal and behavior is normal. Thought content normal.       ED Course   Procedures  Labs Reviewed - No data to display       Imaging Results              CT Head Without Contrast (Final result)  Result time 04/12/23 10:30:19      Final result by Pedro Robles MD (04/12/23 10:30:19)                   Impression:      No CT evidence of acute intracranial abnormality.    Patchy periventricular and subcortical white matter hypoattenuation presumably related to chronic microvascular ischemic change or demyelination, noting prominent asymmetric white matter hypoattenuation adjacent to the posterior horn of the left lateral ventricle.  MRI follow-up could provide improved sensitivity if there is concern for recent infarct given absence of prior imaging for comparison.      Electronically signed by: Pedro Robles  MD  Date:    04/12/2023  Time:    10:30               Narrative:    EXAMINATION:  CT HEAD WITHOUT CONTRAST    CLINICAL HISTORY:  Head trauma, visual loss;    TECHNIQUE:  Low dose axial images were obtained through the head.  Coronal and sagittal reformations were also performed. Contrast was not administered.    COMPARISON:  None.    FINDINGS:  Partially empty sella configuration.  Mild generalized cerebral volume loss.  Patchy and confluent periventricular and subcortical white matter hypoattenuation, nonspecific though potentially related to chronic microvascular ischemic change.  Asymmetric age indeterminate periventricular white matter hypoattenuation adjacent to the posterior horn of the left lateral ventricle (axial image 33).  Evaluating chronicity of this finding is limited by the absence of prior imaging for comparison.    Otherwise, no evidence of acute territorial infarct, hemorrhage, mass effect, or midline shift.    Ventricles are normal in size and configuration.    No displaced calvarial fracture.    Minimal mucosal thickening in the paranasal sinuses.  No air-fluid levels.  Otherwise, the visualized paranasal sinuses and mastoid air cells are essentially clear.                                       X-Ray Ribs 2 View Left (Final result)  Result time 04/12/23 10:25:02      Final result by Pedro Robles MD (04/12/23 10:25:02)                   Impression:      No acute displaced left-sided rib fracture.      Electronically signed by: Pedro Robles MD  Date:    04/12/2023  Time:    10:25               Narrative:    EXAMINATION:  XR RIBS 2 VIEW LEFT    CLINICAL HISTORY:  Unspecified fall, initial encounter.    TECHNIQUE:  Two views of the left ribs were performed.    COMPARISON:  Chest radiograph, same day.    FINDINGS:  No acute displaced left-sided rib fracture.  No subcutaneous emphysema in the chest wall.  No distinct pneumothorax.                                       X-Ray Chest 1 View (Final  "result)  Result time 04/12/23 10:23:57      Final result by Pedro Robles MD (04/12/23 10:23:57)                   Impression:      No acute cardiopulmonary finding identified on this single view.      Electronically signed by: Pedro Robles MD  Date:    04/12/2023  Time:    10:23               Narrative:    EXAMINATION:  XR CHEST 1 VIEW    CLINICAL HISTORY:  Provided history is "  Pleurodynia".    TECHNIQUE:  One view of the chest.    COMPARISON:  05/17/2018.    FINDINGS:  Cardiac silhouette is not enlarged.  No focal consolidation.  No sizable pleural effusion.  No pneumothorax.                                       Medications   LIDOcaine 5 % patch 1 patch (1 patch Transdermal Patch Applied 4/12/23 0931)     Medical Decision Making:   Initial Assessment:   Patient presents with concern of continued pain after fall that occurred 2 days ago.  Was seen at outside facility were patient reports she had CT of head, neck and abdomen with no abnormal findings.  She continues have headache, left eye "blurred vision", nausea, neck pain and left-sided rib pain.  No cough, shortness breath, abdominal pain, vomiting, urinary or bowel complaints, numbness, focal weakness.  Afebrile with vitals grossly unremarkable, noting mildly hypertensive 144/84.  Superficial abrasions noted to frontal scalp and bilateral hands.  No neurological deficits noted.  Differential Diagnosis:   Scalp contusion, concussion, laceration, skull fracture,diffuse axonal injury, countercoup injury, intracranial hemorrhage such as subdural hematoma, subarachnoid hemorrhage or epidural hematoma, cerebral contusion, soft tissue injury, paraspinal or spinal injury    ED Management:  CT head, CXR, left rib x-ray    CT head negative for acute cranial or intracranial abnormalities.  CXR per my interpretation with no acute cardiopulmonary findings or obvious rib fractures.  Left rib x-ray per my interpretation also showing no obvious rib " fractures.    Visual acuity noted have deficits on left side (20/70) compared to right (20/30).  No globe injury with no other obvious physical abnormalities on exam.  Denying floaters or flashes of light.  Will referred Ophthalmology for further evaluation.  Patient was supplied with phone number for close outpatient clinic follow-up.    Prescription written for Lidoderm patches and Robaxin, encouraging home Tylenol as needed, ice/heat, stretches and movements as tolerated, close PCP follow-up.  ED return precautions were discussed at length.  Patient states her understanding and agrees with plan.  Other:   I have discussed this case with another health care provider.       <> Summary of the Discussion: Patient discussed with attending, Dr. James, who agrees with ED course and dispo.                        Clinical Impression:   Final diagnoses:  [R07.81] Rib pain on left side  [W19.XXXA] Fall  [S00.03XA] Contusion of scalp, initial encounter (Primary)        ED Disposition Condition    Discharge Stable          ED Prescriptions       Medication Sig Dispense Start Date End Date Auth. Provider    LIDOcaine (LIDODERM) 5 % Place 1 patch onto the skin once daily. Remove & Discard patch within 12 hours or as directed by MD 5 patch 4/12/2023 -- Adrian Pimentel PA-C    methocarbamoL (ROBAXIN) 500 MG Tab Take 1 tablet (500 mg total) by mouth 3 (three) times daily. for 5 days 15 tablet 4/12/2023 4/17/2023 Adrian Pimentel PA-C          Follow-up Information       Follow up With Specialties Details Why Contact Info    Janet Delong MD Family Medicine   [address] [address] [address] [phone]               Adrian Pimentel PA-C  04/12/23 [id number]

## 2023-04-12 NOTE — DISCHARGE INSTRUCTIONS
Ophthalmology:  [phone]    Thank you for letting myself and our team take care for you today! It was nice meeting you, and I hope you feel better very soon. Please come back to [facility] for all of your future medical needs.   Our goal in the ER is to always give you outstanding care and exceptional service. You may receive a survey by mail or email in the next week about your experience in our ED. We would greatly appreciate you completing and returning the survey. Your feedback provides us with a way to recognize our staff who give very good care and it helps us learn how to improve when your experience was below our aspiration of excellence.     Sincerely,     Adrian Pimentel PA-C  Emergency Room Physician Assistant  [facility]

## 2023-06-16 ENCOUNTER — PATIENT MESSAGE (OUTPATIENT)
Dept: PODIATRY | Facility: CLINIC | Age: 63
End: 2023-06-16
Payer: MEDICAID

## 2023-06-26 ENCOUNTER — HOSPITAL ENCOUNTER (EMERGENCY)
Facility: HOSPITAL | Age: 63
Discharge: HOME | End: 2023-06-26
Attending: FAMILY MEDICINE
Payer: MEDICAID

## 2023-06-26 VITALS
SYSTOLIC BLOOD PRESSURE: 116 MMHG | RESPIRATION RATE: 20 BRPM | BODY MASS INDEX: 37.61 KG/M2 | HEART RATE: 97 BPM | WEIGHT: 234 LBS | DIASTOLIC BLOOD PRESSURE: 84 MMHG | OXYGEN SATURATION: 98 % | TEMPERATURE: 99 F | HEIGHT: 66 IN

## 2023-06-26 DIAGNOSIS — R07.81 RIB PAIN ON LEFT SIDE: ICD-10-CM

## 2023-06-26 PROCEDURE — 99283 EMERGENCY DEPT VISIT LOW MDM: CPT | Mod: ER

## 2023-06-26 RX ORDER — HYDROCODONE BITARTRATE AND ACETAMINOPHEN 10; 325 MG/1; MG/1
1 TABLET ORAL EVERY 6 HOURS PRN
Qty: 8 TABLET | Refills: 0 | Status: SHIPPED | OUTPATIENT
Start: 2023-06-26 | End: 2023-06-28

## 2023-06-26 NOTE — ED PROVIDER NOTES
Encounter Date: 6/26/2023       History     Chief Complaint   Patient presents with    Rib Injury     Patient reports she is having left axillary rib pain from a fall on 04/10/23. She was at PT today and the pain increased. No recent falls or trauma.      62-year-old female presents to the ED with continued pain to left sided ribs after a fall on 4/10/23.  Patient went to physical therapy this morning, and reports increased pain after therapy causing her to come to the emergency room.  She has been taking hydrocodone 10 for the pain, but reports running out.  Tylenol, Robaxin, Lidoderm does not help the symptoms.  Pain is 10/10, non radiating, worse with movement.  Denies chest pain and shortness of breath. No numbness or tingling. Denies any falls or trauma in interim. She sees Dr. Fausto Tejeda, for chiropractics and rehab. Patient reports she has been referred to pain management by her chiropractor, but has not heard from pain mangement.    The history is provided by the patient.   Review of patient's allergies indicates:   Allergen Reactions    Aloe vera Itching    Gabapentin     Hydrochlorothiazide     Lantus [insulin glargine] Swelling     Face swelling and itching.    Metformin Other (See Comments)    Penicillins Rash    Sitagliptin Nausea Only     Past Medical History:   Diagnosis Date    Arthritis     Asthma     CHF (congestive heart failure)     Diabetes mellitus     GERD (gastroesophageal reflux disease)     Hypertension     Neuropathy      Past Surgical History:   Procedure Laterality Date    ARTHROSCOPIC CHONDROPLASTY OF KNEE JOINT  8/13/2018    Procedure: ARTHROSCOPY, KNEE, WITH CHONDROPLASTY;  Surgeon: Jairo Cruz MD;  Location: [facility] OR;  Service: Orthopedics;;    HAND SURGERY Left     KNEE ARTHROSCOPY W/ MENISCECTOMY Right 8/13/2018    Procedure: ARTHROSCOPY, KNEE, WITH MENISCECTOMY;  Surgeon: Jairo Cruz MD;  Location: [facility] OR;  Service: Orthopedics;  Laterality: Right;    TUBAL  LIGATION       Family History   Problem Relation Age of Onset    Cancer Mother     Cancer Father     Breast cancer Maternal Grandmother      Social History     Tobacco Use    Smoking status: Never    Smokeless tobacco: Never    Tobacco comments:     Stay smoke free.   Substance Use Topics    Alcohol use: No    Drug use: No     Review of Systems   Constitutional:  Negative for chills and fever.   HENT:  Negative for congestion.    Respiratory:  Negative for shortness of breath.    Cardiovascular:  Negative for chest pain.   Musculoskeletal:         Left sided rib pain   Psychiatric/Behavioral:  Negative for agitation and confusion.      Physical Exam     Initial Vitals [06/26/23 1143]   BP Pulse Resp Temp SpO2   116/84 97 20 98.5 °F (36.9 °C) 98 %      MAP       --         Physical Exam    Nursing note and vitals reviewed.  Constitutional: She appears well-developed. She is not diaphoretic. She is Obese .  Non-toxic appearance. No distress.   HENT:   Head: Normocephalic and atraumatic.   Right Ear: Hearing and external ear normal.   Left Ear: Hearing and external ear normal.   Eyes: EOM are normal.   Neck: Neck supple.   Normal range of motion.  Cardiovascular:  Normal rate and regular rhythm.           Pulmonary/Chest: Breath sounds normal. No respiratory distress. She has no decreased breath sounds. She has no wheezes. She has no rales.           Abdominal: She exhibits no distension.   Musculoskeletal:         General: Normal range of motion.      Cervical back: Normal range of motion and neck supple. Normal range of motion.     Neurological: She is alert and oriented to person, place, and time. GCS score is 15. GCS eye subscore is 4. GCS verbal subscore is 5. GCS motor subscore is 6.   Skin: Skin is warm and dry.   Psychiatric: She has a normal mood and affect. Her behavior is normal. Judgment and thought content normal.       ED Course   Procedures  Labs Reviewed - No data to display       Imaging Results               X-Ray Ribs 2 View Left (Final result)  Result time 06/26/23 12:41:07      Final result by EASTON Foreman Sr., MD (06/26/23 12:41:07)                   Impression:      Normal study.      Electronically signed by: Rui Foreman MD  Date:    06/26/2023  Time:    12:41               Narrative:    EXAMINATION:  XR RIBS 2 VIEW LEFT    CLINICAL HISTORY:  Pleurodynia    COMPARISON:  04/12/2023    FINDINGS:  There is no rib fracture visualized.  The left lung is clear.  There is no pneumothorax.  The left costophrenic angle is sharp..                                       Medications - No data to display  Medical Decision Making:   History:   Old Records Summarized: other records.       <> Summary of Records: 4/12/23: CT head negative for acute abnormality. XR chest and left ribs WNL.   Initial Assessment:   Well appearing 62 year old female. Vitals WNL. No distress. Pain to left lateral ribs for >2 months after a fall. No injury in interim. Left lateral ribs tender to palpation, but no visible deformity or ecchymosis. No crepitus. Patient getting pain medications from [facility], who has additionally referred patient to pain management. Will repeat rib XR. Lungs CTAB. No other complaints at this time. Denies CP. SOB.  Differential Diagnosis:   Differential Diagnosis includes, but is not limited to:  Rib contusion, fracture, nerve injury/palsy, muscle strain      ED Management:  XR left ribs WNL. Contused from prior fall. Unable to give pain medication in ED as patient is driving today. Will give patient two day supply of pain medication. Patient instructed to follow up with PCP and chiropractor. Will additionally place referral to pain management within [facility] system. Patient agreeable to treatment plan.     The patient appears to be low risk for an emergent medical condition and is appropriate for discharge with outpatient follow up as detailed in discharge instructions for reevaluation  and possible continued outpatient workup and management. I have discussed the workup with the patient, who has verbalized understanding of the plan and need for outpatient follow-up.  This evaluation does not preclude the development of an emergent condition so in addition, I have advised the patient that they can return to the ED at any time with worsening or change of their symptoms, or with any other medical complaint.              ED Course as of 06/26/23 1317   Mon Jun 26, 2023   1248 X-Ray Ribs 2 View Left  Normal study. Agree, no obvious rib fractures visualized.  [CS]      ED Course User Index  [CS] Aniya Smith PA-C                 Clinical Impression:   Final diagnoses:  [R07.81] Rib pain on left side        ED Disposition Condition    Discharge Stable          ED Prescriptions       Medication Sig Dispense Start Date End Date Auth. Provider    HYDROcodone-acetaminophen (NORCO)  mg per tablet Take 1 tablet by mouth every 6 (six) hours as needed for Pain. 8 tablet 6/26/2023 6/28/2023 Aniya Smith PA-C          Follow-up Information       Follow up With Specialties Details Why Contact Info    Janet Delong MD Family Medicine Schedule an appointment as soon as possible for a visit   [id number] [place] [id number] [phone]      Fausto Tejeda DC Chiropractic Medicine Schedule an appointment as soon as possible for a visit   [address] [facility] [id number] [phone]               Aniya Smith PA-C  06/26/23 3596

## 2023-06-26 NOTE — DISCHARGE INSTRUCTIONS
I have placed a referral to pain management. They will contact you with an appointment time. Rotate using ice and heat to help with your symptoms. It was nice meeting you, and I hope you feel better soon. You may return to the ER at any time for any new or concerning symptoms, worsening condition, or failure to improve.    Our goal in the ER is to always give you outstanding care and exceptional service. You may receive a survey by mail or email in the next week about your experience in our ED. We would greatly appreciate you completing and returning the survey. Your feedback provides us with a way to recognize our staff who give very good care and it helps us learn how to improve when your experience was below our aspiration of excellence.     Sincerely,     Aniya Smith PA-C  Emergency Room Physician Assistant  [facility]

## 2024-05-08 ENCOUNTER — HOSPITAL ENCOUNTER (EMERGENCY)
Facility: HOSPITAL | Age: 64
Discharge: HOME | End: 2024-05-08
Attending: STUDENT IN AN ORGANIZED HEALTH CARE EDUCATION/TRAINING PROGRAM
Payer: MEDICAID

## 2024-05-08 VITALS
HEIGHT: 66 IN | OXYGEN SATURATION: 96 % | TEMPERATURE: 99 F | RESPIRATION RATE: 18 BRPM | SYSTOLIC BLOOD PRESSURE: 157 MMHG | WEIGHT: 246 LBS | BODY MASS INDEX: 39.53 KG/M2 | HEART RATE: 85 BPM | DIASTOLIC BLOOD PRESSURE: 77 MMHG

## 2024-05-08 DIAGNOSIS — L02.91 ABSCESS: Primary | ICD-10-CM

## 2024-05-08 DIAGNOSIS — R73.9 HYPERGLYCEMIA: ICD-10-CM

## 2024-05-08 LAB — POCT GLUCOSE: 408 MG/DL (ref 70–110)

## 2024-05-08 PROCEDURE — 25000003 PHARM REV CODE 250: Mod: ER

## 2024-05-08 PROCEDURE — 99283 EMERGENCY DEPT VISIT LOW MDM: CPT | Mod: ER

## 2024-05-08 PROCEDURE — 82962 GLUCOSE BLOOD TEST: CPT | Mod: ER

## 2024-05-08 RX ORDER — LIDOCAINE HYDROCHLORIDE 10 MG/ML
10 INJECTION, SOLUTION EPIDURAL; INFILTRATION; INTRACAUDAL; PERINEURAL
Status: COMPLETED | OUTPATIENT
Start: 2024-05-08 | End: 2024-05-08

## 2024-05-08 RX ORDER — SULFAMETHOXAZOLE AND TRIMETHOPRIM 800; 160 MG/1; MG/1
1 TABLET ORAL 2 TIMES DAILY
Qty: 13 TABLET | Refills: 0 | Status: SHIPPED | OUTPATIENT
Start: 2024-05-08 | End: 2024-05-08

## 2024-05-08 RX ORDER — SULFAMETHOXAZOLE AND TRIMETHOPRIM 800; 160 MG/1; MG/1
1 TABLET ORAL 2 TIMES DAILY
Qty: 13 TABLET | Refills: 0 | Status: SHIPPED | OUTPATIENT
Start: 2024-05-08 | End: 2024-05-15

## 2024-05-08 RX ORDER — SULFAMETHOXAZOLE AND TRIMETHOPRIM 800; 160 MG/1; MG/1
1 TABLET ORAL
Status: COMPLETED | OUTPATIENT
Start: 2024-05-08 | End: 2024-05-08

## 2024-05-08 RX ADMIN — SULFAMETHOXAZOLE AND TRIMETHOPRIM 1 TABLET: 800; 160 TABLET ORAL at 05:05

## 2024-05-08 RX ADMIN — LIDOCAINE HYDROCHLORIDE 100 MG: 10 INJECTION, SOLUTION EPIDURAL; INFILTRATION; INTRACAUDAL at 05:05

## 2024-05-08 NOTE — DISCHARGE INSTRUCTIONS
Please make sure to maintain to keep the area clean and dry. Wash with soap and water. If the abscess is in an area you shave then change your razor and do not shave the area until the wound is healed. Do not share towels or other personal items. Use warm compresses 10-15 minutes, 4-5 times a day to help increase wound drainage and decrease swelling. Your wound will continue to drain which is normal. The size of the bump and pain should decrease with time. Take your antibiotic medication as prescribed. Take Tylenol for pain as needed.      Please return to the Emergency Department for any new or worsening problems including: worsening of your abscess, increasing redness or redness extending further up your body, or temperature of greater than 100.4F.     Please follow up with your Primary Care Doctor or Return to the ED in 2-3 days for a wound check, or sooner if you wound gets worse.

## 2024-05-08 NOTE — ED PROVIDER NOTES
Encounter Date: 5/8/2024       History     Chief Complaint   Patient presents with    Abscess     Abscess to lower abdomen x 3 days      Mame Kumari is a 63 y.o. female  has a past medical history of Arthritis, Asthma, CHF (congestive heart failure), Diabetes mellitus, GERD (gastroesophageal reflux disease), Hypertension, and Neuropathy. presenting to the Emergency Department for Painful boil x2 days.  States the pain has gotten worse.  Is unsure if there was any drainage.  Patient unsure if it started as an infected hair follicle.  No fevers or chills.  No abdominal pain. No systemic symptoms.  States she takes Ozempic, glipizide, Lantis for diabetes management.         The history is provided by the patient.     Review of patient's allergies indicates:   Allergen Reactions    Aloe vera Itching    Gabapentin     Hydrochlorothiazide     Lantus [insulin glargine] Swelling     Face swelling and itching.    Metformin Other (See Comments)    Penicillins Rash    Sitagliptin Nausea Only     Past Medical History:   Diagnosis Date    Arthritis     Asthma     CHF (congestive heart failure)     Diabetes mellitus     GERD (gastroesophageal reflux disease)     Hypertension     Neuropathy      Past Surgical History:   Procedure Laterality Date    ARTHROSCOPIC CHONDROPLASTY OF KNEE JOINT  8/13/2018    Procedure: ARTHROSCOPY, KNEE, WITH CHONDROPLASTY;  Surgeon: Jairo Cruz MD;  Location: [facility] OR;  Service: Orthopedics;;    HAND SURGERY Left     KNEE ARTHROSCOPY W/ MENISCECTOMY Right 8/13/2018    Procedure: ARTHROSCOPY, KNEE, WITH MENISCECTOMY;  Surgeon: Jairo Cruz MD;  Location: [facility] OR;  Service: Orthopedics;  Laterality: Right;    TUBAL LIGATION       Family History   Problem Relation Name Age of Onset    Cancer Mother      Cancer Father      Breast cancer Maternal Grandmother       Social History     Tobacco Use    Smoking status: Never    Smokeless tobacco: Never    Tobacco comments:     Stay smoke free.    Substance Use Topics    Alcohol use: No    Drug use: No     Review of Systems   Constitutional:  Negative for fever.   HENT:  Negative for sore throat.    Respiratory:  Negative for shortness of breath.    Cardiovascular:  Negative for chest pain.   Gastrointestinal:  Negative for nausea.   Genitourinary:  Negative for dysuria.   Musculoskeletal:  Negative for back pain.   Skin:  Positive for wound. Negative for rash.   Neurological:  Negative for weakness.   Hematological:  Does not bruise/bleed easily.   All other systems reviewed and are negative.      Physical Exam     Initial Vitals [05/08/24 1654]   BP Pulse Resp Temp SpO2   (!) 157/77 85 18 98.6 °F (37 °C) 96 %      MAP       --         Physical Exam    Nursing note and vitals reviewed.  Constitutional: She appears well-developed and well-nourished. She is not diaphoretic.  Non-toxic appearance. No distress.   HENT:   Head: Normocephalic and atraumatic.   Right Ear: Hearing and external ear normal.   Left Ear: Hearing and external ear normal.   Nose: Nose normal.   Eyes: Conjunctivae and EOM are normal. Pupils are equal, round, and reactive to light.   Neck: Neck supple.   Normal range of motion.  Cardiovascular:  Normal rate.           Pulmonary/Chest: No respiratory distress.   Abdominal: She exhibits no distension.   Musculoskeletal:         General: Normal range of motion.      Cervical back: Normal range of motion and neck supple. Normal range of motion.     Neurological: She is alert and oriented to person, place, and time. GCS score is 15. GCS eye subscore is 4. GCS verbal subscore is 5. GCS motor subscore is 6.   Skin: Skin is warm and dry.        Psychiatric: She has a normal mood and affect. Her behavior is normal. Judgment and thought content normal.         ED Course   Procedures  Labs Reviewed   POCT GLUCOSE - Abnormal; Notable for the following components:       Result Value    POCT Glucose 408 (*)     All other components within normal limits    POCT GLUCOSE MONITORING CONTINUOUS          Imaging Results    None          Medications   LIDOcaine (PF) 10 mg/ml (1%) injection 100 mg (100 mg Other Given 5/8/24 1717)   sulfamethoxazole-trimethoprim 800-160mg per tablet 1 tablet (1 tablet Oral Given 5/8/24 6516)     Medical Decision Making  This is an evaluation of a 63 y.o. female that presents to the Emergency Department for an abscess. Physical Exam shows a in no apparent distress, well developed and well nourished, non-toxic, in no respiratory distress and acyanotic, alert, oriented times 3, and afebrile female. There is an abscess to the suprapubic region along pants line with induration, purulent drainage. There is active drainage. . No pain/tenderness outside of indurated region. No areas of bruising, bullae, or crepitus. Vital Signs Are Reassuring.     My overall impression is Abscess of the suprapubic region. I considered, but at this time, do not suspect an extensive cellulitis, sepsis, necrotizing fasciitis, or bacteremia.     Discharge Meds/Instructions: bactrim. PCP f/u for wound check    There does not appear to be any indication for further emergent testing, observation, or hospitalization at this time. A mutual shared decision making discussion was had with the patient. Patient appears stable for and is comfortable with discharge home. The diagnosis, treatment plan, instructions for follow-up as well as ED return precautions were discussed. Advised to follow-up with PCP for outpatient follow-up in 2-3 days. Signs and symptoms that would warrant immediate return to ED were reviewed prior to discharge. All questions and concerns were asked, answered, and addressed. Patient expressed understanding and agreement with the plan.     This case was discussed with and the patient was independently examined by my attending, Dr. Correia who is in agreement with my assessment and plan.      Amount and/or Complexity of Data Reviewed  Labs:  Decision-making  details documented in ED Course.    Risk  Prescription drug management.               ED Course as of 05/08/24 1913   Wed May 08, 2024   1712 POCT Glucose(!): 408  Pt states she just ate before coming. [LH]   1741 Patient independently seen by myself and Dr. Correia.  Had a mutual shared decision-making discussion with the patient regarding her suprapubic abscess.  The abscess being located in the pants line, patient's uncontrolled glucose, and the patient's body habitus increase the risk of complications following incision and drainage.  The location of the abscess has a risk of poor healing.  Discussed with the patient the options of oral antibiotics versus the incision and drainage.  Patient would like to proceed with oral antibiotics.  Patient instructed that her wound will need to be re-evaluated in the next 2 days.  Patient expresses understanding.  All of patient's questions asked and answered. [LH]      ED Course User Index  [LH] Lori Romero PA-C                           Clinical Impression:  Final diagnoses:  [L02.91] Abscess (Primary)  [R73.9] Hyperglycemia          ED Disposition Condition    Discharge Stable          ED Prescriptions       Medication Sig Dispense Start Date End Date Auth. Provider    sulfamethoxazole-trimethoprim 800-160mg (BACTRIM DS) 800-160 mg Tab  (Status: Discontinued) Take 1 tablet by mouth 2 (two) times daily. for 7 days 13 tablet 5/8/2024 5/8/2024 Lori Romero PA-C    sulfamethoxazole-trimethoprim 800-160mg (BACTRIM DS) 800-160 mg Tab Take 1 tablet by mouth 2 (two) times daily. for 7 days 13 tablet 5/8/2024 5/15/2024 Lori Romero PA-C          Follow-up Information       Follow up With Specialties Details Why Contact Info    Janet Delong MD Family Medicine Schedule an appointment as soon as possible for a visit in 2 days  [address] [address] [id number] [phone]               Lori Romero PA-C  05/08/24 1913

## 2024-06-13 ENCOUNTER — TELEPHONE (OUTPATIENT)
Dept: PODIATRY | Facility: CLINIC | Age: 64
End: 2024-06-13
Payer: MEDICAID

## 2024-06-13 NOTE — TELEPHONE ENCOUNTER
----- Message from Gloria Lizbeth sent at 6/13/2024  2:29 PM CDT -----  Type:  Sooner Appointment Request    Caller is requesting a sooner appointment.  Caller declined first available appointment listed below.  Caller will not accept being placed on the waitlist and is requesting a message be sent to doctor.  Name of Caller:pt  When is the first available appointment?n/a  Symptoms:Calluses   Would the patient rather a call back or a response via GuestSpanner? call  Best Call Back Number: [phone]  Additional Information:        Called patient let her know we have an appt available on 06/21 at 10:15 am she accepted the appointment.

## 2024-12-23 DIAGNOSIS — R07.9 CHEST PAIN, UNSPECIFIED: Primary | ICD-10-CM

## 2024-12-26 DIAGNOSIS — R10.9 ABDOMINAL PAIN, UNSPECIFIED ABDOMINAL LOCATION: Primary | ICD-10-CM
